# Patient Record
Sex: MALE | Race: WHITE | Employment: OTHER | URBAN - METROPOLITAN AREA
[De-identification: names, ages, dates, MRNs, and addresses within clinical notes are randomized per-mention and may not be internally consistent; named-entity substitution may affect disease eponyms.]

---

## 2017-01-09 ENCOUNTER — OFFICE VISIT (OUTPATIENT)
Dept: SURGERY | Age: 67
End: 2017-01-09

## 2017-01-09 VITALS
BODY MASS INDEX: 29.52 KG/M2 | DIASTOLIC BLOOD PRESSURE: 88 MMHG | HEART RATE: 62 BPM | WEIGHT: 230 LBS | HEIGHT: 74 IN | SYSTOLIC BLOOD PRESSURE: 138 MMHG

## 2017-01-09 DIAGNOSIS — K42.9 UMBILICAL HERNIA WITHOUT OBSTRUCTION AND WITHOUT GANGRENE: Primary | ICD-10-CM

## 2017-01-09 PROCEDURE — G8420 CALC BMI NORM PARAMETERS: HCPCS | Performed by: SURGERY

## 2017-01-09 PROCEDURE — G8484 FLU IMMUNIZE NO ADMIN: HCPCS | Performed by: SURGERY

## 2017-01-09 PROCEDURE — 1036F TOBACCO NON-USER: CPT | Performed by: SURGERY

## 2017-01-09 PROCEDURE — 4040F PNEUMOC VAC/ADMIN/RCVD: CPT | Performed by: SURGERY

## 2017-01-09 PROCEDURE — G8427 DOCREV CUR MEDS BY ELIG CLIN: HCPCS | Performed by: SURGERY

## 2017-01-09 PROCEDURE — 99203 OFFICE O/P NEW LOW 30 MIN: CPT | Performed by: SURGERY

## 2017-01-09 PROCEDURE — 3017F COLORECTAL CA SCREEN DOC REV: CPT | Performed by: SURGERY

## 2017-01-09 PROCEDURE — 1123F ACP DISCUSS/DSCN MKR DOCD: CPT | Performed by: SURGERY

## 2017-01-09 RX ORDER — TERAZOSIN 10 MG/1
10 CAPSULE ORAL NIGHTLY
COMMUNITY

## 2017-02-02 ENCOUNTER — TELEPHONE (OUTPATIENT)
Dept: SURGERY | Age: 67
End: 2017-02-02

## 2017-02-02 ASSESSMENT — ENCOUNTER SYMPTOMS: ABDOMINAL PAIN: 1

## 2017-02-17 ENCOUNTER — TELEPHONE (OUTPATIENT)
Dept: SURGERY | Age: 67
End: 2017-02-17

## 2017-02-22 ENCOUNTER — PAT TELEPHONE (OUTPATIENT)
Dept: PREADMISSION TESTING | Age: 67
End: 2017-02-22

## 2017-02-22 VITALS — BODY MASS INDEX: 28.88 KG/M2 | HEIGHT: 74 IN | WEIGHT: 225 LBS

## 2017-02-22 RX ORDER — FLUOROURACIL 50 MG/G
CREAM TOPICAL 2 TIMES DAILY
COMMUNITY
End: 2018-04-30

## 2017-02-22 RX ORDER — CETIRIZINE HYDROCHLORIDE 10 MG/1
10 TABLET ORAL PRN
COMMUNITY

## 2017-02-24 ENCOUNTER — HOSPITAL ENCOUNTER (OUTPATIENT)
Dept: SURGERY | Age: 67
Discharge: HOME | End: 2017-02-24
Attending: SURGERY | Admitting: SURGERY

## 2017-02-24 VITALS
DIASTOLIC BLOOD PRESSURE: 77 MMHG | HEART RATE: 55 BPM | RESPIRATION RATE: 18 BRPM | TEMPERATURE: 97.5 F | OXYGEN SATURATION: 98 % | SYSTOLIC BLOOD PRESSURE: 137 MMHG

## 2017-02-24 PROCEDURE — 49585 REPAIR UMBILICAL HERN,5+Y/O,REDUC: CPT | Performed by: SURGERY

## 2017-02-24 RX ORDER — SODIUM CHLORIDE 0.9 % (FLUSH) 0.9 %
10 SYRINGE (ML) INJECTION EVERY 12 HOURS SCHEDULED
Status: DISCONTINUED | OUTPATIENT
Start: 2017-02-24 | End: 2017-02-25 | Stop reason: HOSPADM

## 2017-02-24 RX ORDER — FENTANYL CITRATE 50 UG/ML
25 INJECTION, SOLUTION INTRAMUSCULAR; INTRAVENOUS EVERY 5 MIN PRN
Status: DISCONTINUED | OUTPATIENT
Start: 2017-02-24 | End: 2017-02-25 | Stop reason: HOSPADM

## 2017-02-24 RX ORDER — LABETALOL HYDROCHLORIDE 5 MG/ML
5 INJECTION, SOLUTION INTRAVENOUS EVERY 10 MIN PRN
Status: DISCONTINUED | OUTPATIENT
Start: 2017-02-24 | End: 2017-02-25 | Stop reason: HOSPADM

## 2017-02-24 RX ORDER — OXYCODONE HYDROCHLORIDE 5 MG/1
5 TABLET ORAL EVERY 4 HOURS PRN
Qty: 30 TABLET | Refills: 0 | Status: SHIPPED | OUTPATIENT
Start: 2017-02-24 | End: 2017-03-09 | Stop reason: CLARIF

## 2017-02-24 RX ORDER — PROMETHAZINE HYDROCHLORIDE 25 MG/ML
6.25 INJECTION, SOLUTION INTRAMUSCULAR; INTRAVENOUS
Status: ACTIVE | OUTPATIENT
Start: 2017-02-24 | End: 2017-02-24

## 2017-02-24 RX ORDER — SODIUM CHLORIDE 0.9 % (FLUSH) 0.9 %
10 SYRINGE (ML) INJECTION PRN
Status: DISCONTINUED | OUTPATIENT
Start: 2017-02-24 | End: 2017-02-25 | Stop reason: HOSPADM

## 2017-02-24 RX ORDER — SODIUM CHLORIDE 9 MG/ML
INJECTION, SOLUTION INTRAVENOUS CONTINUOUS
Status: DISCONTINUED | OUTPATIENT
Start: 2017-02-24 | End: 2017-02-25 | Stop reason: HOSPADM

## 2017-02-24 RX ADMIN — SODIUM CHLORIDE: 9 INJECTION, SOLUTION INTRAVENOUS at 08:09

## 2017-02-24 ASSESSMENT — PAIN DESCRIPTION - PAIN TYPE
TYPE: SURGICAL PAIN
TYPE: SURGICAL PAIN

## 2017-02-24 ASSESSMENT — PAIN SCALES - GENERAL
PAINLEVEL_OUTOF10: 2
PAINLEVEL_OUTOF10: 0
PAINLEVEL_OUTOF10: 2

## 2017-02-24 ASSESSMENT — PAIN - FUNCTIONAL ASSESSMENT: PAIN_FUNCTIONAL_ASSESSMENT: 0-10

## 2017-03-09 ENCOUNTER — OFFICE VISIT (OUTPATIENT)
Dept: SURGERY | Age: 67
End: 2017-03-09

## 2017-03-09 DIAGNOSIS — K42.9 UMBILICAL HERNIA WITHOUT OBSTRUCTION AND WITHOUT GANGRENE: Primary | ICD-10-CM

## 2017-03-09 PROCEDURE — 99024 POSTOP FOLLOW-UP VISIT: CPT | Performed by: SURGERY

## 2018-04-09 ENCOUNTER — TELEPHONE (OUTPATIENT)
Dept: CARDIOLOGY CLINIC | Age: 68
End: 2018-04-09

## 2018-04-09 NOTE — TELEPHONE ENCOUNTER
Dr. Paris Persaud asked that this patient be scheduled with EP soon for ER follow up for PSVT (new to us). I see that [facility] has an opening 4/26/18 at 8:30 but if Fátima Durbin has something sooner, that would be great. Can you put him on schedule and call him to confirm? Thanks so much!

## 2018-04-10 NOTE — TELEPHONE ENCOUNTER
Pt spoke to his PCP Dr Zhanna Durand and pt is going with  at [address] [place] [address].  Lupe Warner

## 2018-04-16 ENCOUNTER — TELEPHONE (OUTPATIENT)
Dept: CARDIOLOGY CLINIC | Age: 68
End: 2018-04-16

## 2018-04-30 ENCOUNTER — OFFICE VISIT (OUTPATIENT)
Dept: ENT CLINIC | Age: 68
End: 2018-04-30

## 2018-04-30 VITALS
SYSTOLIC BLOOD PRESSURE: 108 MMHG | HEIGHT: 74 IN | HEART RATE: 49 BPM | WEIGHT: 227 LBS | DIASTOLIC BLOOD PRESSURE: 68 MMHG | BODY MASS INDEX: 29.13 KG/M2

## 2018-04-30 DIAGNOSIS — H93.13 SUBJECTIVE TINNITUS OF BOTH EARS: Primary | Chronic | ICD-10-CM

## 2018-04-30 DIAGNOSIS — H91.93 BILATERAL HEARING LOSS, UNSPECIFIED HEARING LOSS TYPE: Chronic | ICD-10-CM

## 2018-04-30 PROCEDURE — 3017F COLORECTAL CA SCREEN DOC REV: CPT | Performed by: OTOLARYNGOLOGY

## 2018-04-30 PROCEDURE — G8427 DOCREV CUR MEDS BY ELIG CLIN: HCPCS | Performed by: OTOLARYNGOLOGY

## 2018-04-30 PROCEDURE — 1036F TOBACCO NON-USER: CPT | Performed by: OTOLARYNGOLOGY

## 2018-04-30 PROCEDURE — G8419 CALC BMI OUT NRM PARAM NOF/U: HCPCS | Performed by: OTOLARYNGOLOGY

## 2018-04-30 PROCEDURE — 4040F PNEUMOC VAC/ADMIN/RCVD: CPT | Performed by: OTOLARYNGOLOGY

## 2018-04-30 PROCEDURE — 99203 OFFICE O/P NEW LOW 30 MIN: CPT | Performed by: OTOLARYNGOLOGY

## 2018-04-30 PROCEDURE — 1123F ACP DISCUSS/DSCN MKR DOCD: CPT | Performed by: OTOLARYNGOLOGY

## 2018-05-03 ENCOUNTER — OFFICE VISIT (OUTPATIENT)
Dept: AUDIOLOGY | Age: 68
End: 2018-05-03

## 2018-05-03 DIAGNOSIS — H93.13 TINNITUS OF BOTH EARS: Primary | ICD-10-CM

## 2018-05-03 PROCEDURE — 92550 TYMPANOMETRY & REFLEX THRESH: CPT | Performed by: AUDIOLOGIST

## 2018-05-03 PROCEDURE — 92557 COMPREHENSIVE HEARING TEST: CPT | Performed by: AUDIOLOGIST

## 2018-06-04 ENCOUNTER — OFFICE VISIT (OUTPATIENT)
Dept: ENT CLINIC | Age: 68
End: 2018-06-04

## 2018-06-04 VITALS — DIASTOLIC BLOOD PRESSURE: 59 MMHG | HEART RATE: 48 BPM | SYSTOLIC BLOOD PRESSURE: 109 MMHG

## 2018-06-04 DIAGNOSIS — H90.3 BILATERAL HIGH FREQUENCY SENSORINEURAL HEARING LOSS: Primary | ICD-10-CM

## 2018-06-04 DIAGNOSIS — H93.13 SUBJECTIVE TINNITUS OF BOTH EARS: ICD-10-CM

## 2018-06-04 PROCEDURE — 1036F TOBACCO NON-USER: CPT | Performed by: OTOLARYNGOLOGY

## 2018-06-04 PROCEDURE — G8419 CALC BMI OUT NRM PARAM NOF/U: HCPCS | Performed by: OTOLARYNGOLOGY

## 2018-06-04 PROCEDURE — 4040F PNEUMOC VAC/ADMIN/RCVD: CPT | Performed by: OTOLARYNGOLOGY

## 2018-06-04 PROCEDURE — 3017F COLORECTAL CA SCREEN DOC REV: CPT | Performed by: OTOLARYNGOLOGY

## 2018-06-04 PROCEDURE — G8427 DOCREV CUR MEDS BY ELIG CLIN: HCPCS | Performed by: OTOLARYNGOLOGY

## 2018-06-04 PROCEDURE — 1123F ACP DISCUSS/DSCN MKR DOCD: CPT | Performed by: OTOLARYNGOLOGY

## 2018-06-04 PROCEDURE — 99213 OFFICE O/P EST LOW 20 MIN: CPT | Performed by: OTOLARYNGOLOGY

## 2018-06-04 RX ORDER — IBUPROFEN 200 MG
200 TABLET ORAL EVERY 6 HOURS PRN
COMMUNITY
End: 2019-11-21

## 2018-07-18 NOTE — PROGRESS NOTES
[facility]   Electrophysiology Consultation   Date: 7/19/2018  Reason for Consultation: SVT  Consult Requesting Physician: Nguyễn Miller MD      Chief Complaint   Patient presents with    New Patient     red by dr Lizbet Miranda for SVT        HPI: Jil Baer is a 80 y.o. male being seen to evaluate for SVT. Presented to ED 4/7/2018 for palpitations, CP, SOB, and feelings of dizziness after not sleeping well prior night. It is noted patient stated a history of -200 with SVT in past that he stated he thought was triggered by green tea and had no more episodes since. Denies pre-syncope or syncope episodes. He was drinking wine at the time the occurrence began. He did not follow up with cardiology for SVT in past.   He has recorded about 5 episodes since 4/2017 identified with Fit[employer]. He was able to use vagal maneuvers in past to cease or episodes did spontaneously convert. He has no past cardiac history other than bradycardia episodes in the 40-50's at rest. Not symptomatic with bradycardia. Patient denies lightheadedness, dizziness, palpitations, orthopnea, edema, presyncope or syncope. He is able to stop them with vagal maneuvers       Past Medical History:   Diagnosis Date    BPH (benign prostatic hyperplasia)     Bradycardia     Cancer (HCC)     skin ca removed    SVT (supraventricular tachycardia) ([facility] [address]         Past Surgical History:   Procedure Laterality Date    KNEE SURGERY Left 11/22/2016    TONSILLECTOMY         Allergies: Allergies   Allergen Reactions    Aleve [Naproxen Sodium] Hives    Ibuprofen     Naproxen Hives    Neosporin [Neomycin-Polymyxin-Gramicidin]        Social History:   reports that he quit smoking about 38 years ago. His smoking use included Cigarettes. He started smoking about 49 years ago. He has never used smokeless tobacco. He reports that he drinks alcohol. He reports that he does not use drugs. Family History:     Reviewed.  Denies family history of sudden cardiac death, arrhythmia, premature CAD    Review of System:  All other systems reviewed and are negative except for that noted above. Pertinent negatives are:   General: negative for fever, chills   Ophthalmic ROS: negative for - eye pain or loss of vision  ENT ROS: negative for - headaches, sore throat   Respiratory: negative for - cough, sputum  Cardiovascular: Reviewed in HPI  Gastrointestinal: negative for - abdominal pain, diarrhea, N/V  Hematology: negative for - bleeding, blood clots, bruising or jaundice  Genito-Urinary:  negative for - Dysuria or incontinence  Musculoskeletal: negative for - Joint swelling, muscle pain  Neurological: negative for - confusion, dizziness, headaches   Psychiatric: No anxiety, no depression. Dermatological: negative for - rash    Physical Examination:  Vitals:    07/19/18 0953   BP: 128/60   Pulse: 51   SpO2: 98%      Wt Readings from Last 3 Encounters:   07/19/18 225 lb (102.1 kg)   04/30/18 227 lb (103 kg)   04/07/18 220 lb (99.8 kg)       · Constitutional: Oriented. No distress. · Head: Normocephalic and atraumatic. · Mouth/Throat: Oropharynx is clear and moist.   · Eyes: Conjunctivae normal. EOM are normal.   · Neck: Neck supple. No rigidity. No JVD present. · Cardiovascular: Normal rate, regular rhythm, S1&S2. · Pulmonary/Chest: Bilateral respiratory sounds. No wheezes, No rhonchi. · Abdominal: Soft. Bowel sounds present. No distension, No tenderness. · Musculoskeletal: No tenderness. No edema    · Lymphadenopathy: Has no cervical adenopathy. · Neurological: Alert and oriented. Cranial nerve appears intact, No Gross deficit   · Skin: Skin is warm and dry. No rash noted. · Psychiatric: Has a normal behavior       Labs, diagnostic and imaging results reviewed. Reviewed.    Lab Results   Component Value Date    TSHREFLEX 3.77 04/07/2018    CREATININE 0.9 04/07/2018    CREATININE 0.9 07/27/2017    AST 24 04/07/2018    ALT 22 04/07/2018     ECG 7/19/2018: SB  53bpm  QTcH 414    Cardiac event monitor 7/5/2018:  CONCLUSION:  SINUS RHYTHM  SINUS BRADYCARDIA TO RATES AS LOW AS 38 BPM  TWO RUNS OF SVT TO RATES BETWEEN 180-200 BPM  SYMPTOMS WERE NOTED DURING ONE OF THE EPISODES THAT LASTED AROUND   1 MINUTE   THERE WERE SHORT RUNS OF SVT WITHOUT SYMPTOMS  PAC'S WERE NOTED AND SYMPTOMS WERE NOTED WITH THE PAC'S      Echo 8/8/2017:  - Left ventricle: The cavity size was mildly dilated. Wall thickness    was increased in a pattern of mild LVH. Systolic function was    normal. The estimated ejection fraction was in the range of 55% to    65%. Wall motion was normal; there were no regional wall motion    abnormalities. - Right ventricle: Systolic function was normal. TAPSE: 2.9cm.  - Right atrium: The atrium was mildly dilated. Echo 11/4/2010:  - Left ventricle: The cavity size was mildly to moderately dilated.     Wall thickness was normal. Systolic function was normal. The     estimated ejection fraction was in the range of 55% to 60%. Wall     motion was normal; there were no regional wall motion     abnormalities. Left ventricular diastolic function parameters     were otherwise normal.   - Mitral valve: Mild regurgitation.   - Left atrium: The atrium was mildly dilated. - Right ventricle: The cavity size was mildly dilated. - Pulmonic valve: Peak gradient: 4mm Hg (S). Medication:  Current Outpatient Prescriptions   Medication Sig Dispense Refill    ibuprofen (ADVIL;MOTRIN) 200 MG tablet Take 200 mg by mouth every 6 hours as needed for Pain      cetirizine (ZYRTEC) 10 MG tablet Take 10 mg by mouth as needed for Allergies      terazosin (HYTRIN) 10 MG capsule Take 10 mg by mouth nightly       No current facility-administered medications for this visit. Assessment and plan:     SVT  Noted on EKG from 4/2018 ED. 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  Would use care in medication given history with josep HR. He has had success in stopping episodes so does not feel medication is needed at this time. - discussed AVNRT ablation. The risks, benefits and alternatives of the ablation procedure were discussed with the patient. The risks including, but not limited to, the risks of bleeding, infection, radiation exposure, injury to vascular, cardiac and surrounding structures (including pneumothorax), stroke, cardiac perforation, tamponade, need for emergent open heart surgery, need for pacemaker implantation, Injury to the phrenic nerve, injury to the esophagus, myocardial infarction and death were discussed in detail. The patient opted to consider procedure after praying and speaking with family. Will schedule, he will contact office to cancel should he not want to proceed. Bradycardia  No associated symptoms. Feels well        Plan:  Schedule SVT ablation to have it ready, will contact office should he consider it to be not necessary at current   Follow up 6 months          Thank you for allowing me to participate in the care of Rosio Beatty. Further evaluation will be based upon the patient's clinical course and testing results. All questions and concerns were addressed to the patient/family. Alternatives to my treatment were discussed. I have discussed the above stated plan and the patient verbalized understanding and agreed with the plan. NOTE: This report was transcribed using voice recognition software. Every effort was made to ensure accuracy, however, inadvertent computerized transcription errors may be present. Scribe attestation: This note was scribed in the presence of Torrie Rubin MD by Erick Sierra RN    Physician attestation: The scribe's documentation has been prepared under my direction and has been personally reviewed by me in its entirety. I confirm that the note above reflects all work, treatment, procedures, and medical decision making performed by me.        Eldon Paul Escobedo MD, [facility], [address] [place] [address]   Office: [phone]

## 2018-07-19 ENCOUNTER — PATIENT MESSAGE (OUTPATIENT)
Dept: CARDIOLOGY CLINIC | Age: 68
End: 2018-07-19

## 2018-07-19 ENCOUNTER — OFFICE VISIT (OUTPATIENT)
Dept: CARDIOLOGY CLINIC | Age: 68
End: 2018-07-19

## 2018-07-19 VITALS
HEART RATE: 51 BPM | DIASTOLIC BLOOD PRESSURE: 60 MMHG | OXYGEN SATURATION: 98 % | HEIGHT: 74 IN | WEIGHT: 225 LBS | BODY MASS INDEX: 28.88 KG/M2 | SYSTOLIC BLOOD PRESSURE: 128 MMHG

## 2018-07-19 DIAGNOSIS — R00.1 BRADYCARDIA: ICD-10-CM

## 2018-07-19 DIAGNOSIS — I47.1 SVT (SUPRAVENTRICULAR TACHYCARDIA) (HCC): Primary | ICD-10-CM

## 2018-07-19 PROCEDURE — 1123F ACP DISCUSS/DSCN MKR DOCD: CPT | Performed by: INTERNAL MEDICINE

## 2018-07-19 PROCEDURE — 1101F PT FALLS ASSESS-DOCD LE1/YR: CPT | Performed by: INTERNAL MEDICINE

## 2018-07-19 PROCEDURE — G8419 CALC BMI OUT NRM PARAM NOF/U: HCPCS | Performed by: INTERNAL MEDICINE

## 2018-07-19 PROCEDURE — 99205 OFFICE O/P NEW HI 60 MIN: CPT | Performed by: INTERNAL MEDICINE

## 2018-07-19 PROCEDURE — 4040F PNEUMOC VAC/ADMIN/RCVD: CPT | Performed by: INTERNAL MEDICINE

## 2018-07-19 PROCEDURE — G8427 DOCREV CUR MEDS BY ELIG CLIN: HCPCS | Performed by: INTERNAL MEDICINE

## 2018-07-19 PROCEDURE — 3017F COLORECTAL CA SCREEN DOC REV: CPT | Performed by: INTERNAL MEDICINE

## 2018-07-19 PROCEDURE — 93000 ELECTROCARDIOGRAM COMPLETE: CPT | Performed by: INTERNAL MEDICINE

## 2018-07-19 PROCEDURE — 1036F TOBACCO NON-USER: CPT | Performed by: INTERNAL MEDICINE

## 2018-07-19 NOTE — PATIENT INSTRUCTIONS
Patient Education        Supraventricular Tachycardia: Care Instructions  Your Care Instructions    Having supraventricular tachycardia (SVT) means that from time to time your heart beats abnormally fast. This fast rhythm is caused by changes in the electrical system of your heart. You may feel a fluttering in your chest (palpitations) and have a fast pulse. When your heart is beating fast, you may feel anxious and lightheaded, be short of breath, and feel discomfort in the chest.  Your doctor may prescribe medicines to help slow down your heartbeat. Your doctor may also suggest you try vagal maneuvers when having an episode of SVT. These are things, like bearing down, that might help slow your heart rate. Bearing down means that you try to breathe out with your stomach muscles but you don't let air out of your nose or mouth. Your doctor can show you how to do vagal maneuvers. He or she may suggest you lie down on your back to do them. In some cases, either cardioversion treatment or a procedure called catheter ablation is done to correct SVT. Your doctor may ask you to wear a small electronic device for 1 or 2 days to monitor your heart. It is called a Holter monitor. Follow-up care is a key part of your treatment and safety. Be sure to make and go to all appointments, and call your doctor if you are having problems. It's also a good idea to know your test results and keep a list of the medicines you take. How can you care for yourself at home? · Be safe with medicines. Take your medicines exactly as prescribed. Call your doctor if you think you are having a problem with your medicine. You will get more details on the specific medicines your doctor prescribes. · If your doctor showed you how to do vagal maneuvers, try them when you have an episode. These maneuvers include bearing down or putting an ice-cold, wet towel on your face. · Monitor your condition by keeping a diary of your SVT episodes.  Bring this to learn more about \"Supraventricular Tachycardia: Care Instructions. \"     If you do not have an account, please click on the \"Sign Up Now\" link. Current as of: December 6, 2017  Content Version: 11.6  © 2537-9129 [employer]. Care instructions adapted under license by [facility] ([facility]). If you have questions about a medical condition or this instruction, always ask your healthcare professional. [address] any warranty or liability for your use of this information.

## 2018-10-16 ENCOUNTER — TELEPHONE (OUTPATIENT)
Dept: CARDIOLOGY CLINIC | Age: 68
End: 2018-10-16

## 2018-10-16 DIAGNOSIS — I48.3 TYPICAL ATRIAL FLUTTER (HCC): ICD-10-CM

## 2018-10-16 DIAGNOSIS — I47.1 AVNRT (AV NODAL RE-ENTRY TACHYCARDIA) (HCC): ICD-10-CM

## 2018-10-16 NOTE — TELEPHONE ENCOUNTER
He had an ablation 9/11/18 . He has had two \"episodes\" since his ablation , one on 9/28 and one on 10/2. The episodes didn't last long and he is ok now .  He has appt 11/14/18 with JAIME

## 2018-10-25 ENCOUNTER — NURSE ONLY (OUTPATIENT)
Dept: CARDIOLOGY CLINIC | Age: 68
End: 2018-10-25
Payer: MEDICARE

## 2018-10-25 ENCOUNTER — OFFICE VISIT (OUTPATIENT)
Dept: CARDIOLOGY CLINIC | Age: 68
End: 2018-10-25
Payer: MEDICARE

## 2018-10-25 VITALS
DIASTOLIC BLOOD PRESSURE: 75 MMHG | HEIGHT: 74 IN | BODY MASS INDEX: 28.76 KG/M2 | WEIGHT: 224.12 LBS | SYSTOLIC BLOOD PRESSURE: 133 MMHG | HEART RATE: 53 BPM

## 2018-10-25 DIAGNOSIS — I47.1 AVNRT (AV NODAL RE-ENTRY TACHYCARDIA) (HCC): ICD-10-CM

## 2018-10-25 DIAGNOSIS — I48.3 TYPICAL ATRIAL FLUTTER (HCC): ICD-10-CM

## 2018-10-25 DIAGNOSIS — R00.2 PALPITATION: Primary | ICD-10-CM

## 2018-10-25 DIAGNOSIS — R00.1 BRADYCARDIA: ICD-10-CM

## 2018-10-25 PROCEDURE — 1101F PT FALLS ASSESS-DOCD LE1/YR: CPT | Performed by: INTERNAL MEDICINE

## 2018-10-25 PROCEDURE — 99213 OFFICE O/P EST LOW 20 MIN: CPT | Performed by: INTERNAL MEDICINE

## 2018-10-25 PROCEDURE — 4040F PNEUMOC VAC/ADMIN/RCVD: CPT | Performed by: INTERNAL MEDICINE

## 2018-10-25 PROCEDURE — 93000 ELECTROCARDIOGRAM COMPLETE: CPT | Performed by: INTERNAL MEDICINE

## 2018-10-25 PROCEDURE — G8419 CALC BMI OUT NRM PARAM NOF/U: HCPCS | Performed by: INTERNAL MEDICINE

## 2018-10-25 PROCEDURE — 1036F TOBACCO NON-USER: CPT | Performed by: INTERNAL MEDICINE

## 2018-10-25 PROCEDURE — 3017F COLORECTAL CA SCREEN DOC REV: CPT | Performed by: INTERNAL MEDICINE

## 2018-10-25 PROCEDURE — G8484 FLU IMMUNIZE NO ADMIN: HCPCS | Performed by: INTERNAL MEDICINE

## 2018-10-25 PROCEDURE — G8427 DOCREV CUR MEDS BY ELIG CLIN: HCPCS | Performed by: INTERNAL MEDICINE

## 2018-10-25 PROCEDURE — 1123F ACP DISCUSS/DSCN MKR DOCD: CPT | Performed by: INTERNAL MEDICINE

## 2018-10-25 NOTE — PROGRESS NOTES
Allergies: Allergies   Allergen Reactions    Aleve [Naproxen Sodium] Hives    Ibuprofen     Naproxen Hives    Neosporin [Neomycin-Polymyxin-Gramicidin]        Social History:   reports that he quit smoking about 38 years ago. His smoking use included Cigarettes. He started smoking about 49 years ago. He has never used smokeless tobacco. He reports that he drinks alcohol. He reports that he does not use drugs. Family History:     Reviewed. Denies family history of sudden cardiac death, arrhythmia, premature CAD    Review of System:  All other systems reviewed and are negative except for that noted above. Pertinent negatives are:   General: negative for fever, chills   Ophthalmic ROS: negative for - eye pain or loss of vision  ENT ROS: negative for - headaches, sore throat   Respiratory: negative for - cough, sputum  Cardiovascular: Reviewed in HPI  Gastrointestinal: negative for - abdominal pain, diarrhea, N/V  Hematology: negative for - bleeding, blood clots, bruising or jaundice  Genito-Urinary:  negative for - Dysuria or incontinence  Musculoskeletal: negative for - Joint swelling, muscle pain  Neurological: negative for - confusion, dizziness, headaches   Psychiatric: No anxiety, no depression. Dermatological: negative for - rash    Physical Examination:  Vitals:    10/25/18 1128   BP: 133/75   Pulse: 53      Wt Readings from Last 3 Encounters:   10/25/18 224 lb 1.9 oz (101.7 kg)   09/11/18 220 lb (99.8 kg)   07/19/18 225 lb (102.1 kg)       · Constitutional: Oriented. No distress. · Head: Normocephalic and atraumatic. · Mouth/Throat: Oropharynx is clear and moist.   · Eyes: Conjunctivae normal. EOM are normal.   · Neck: Neck supple. No rigidity. No JVD present. · Cardiovascular: Normal rate, regular rhythm, S1&S2. · Pulmonary/Chest: Bilateral respiratory sounds. No wheezes, No rhonchi. · Abdominal: Soft. Bowel sounds present. No distension, No tenderness.    · Musculoskeletal: No tenderness. No edema    · Lymphadenopathy: Has no cervical adenopathy. · Neurological: Alert and oriented. Cranial nerve appears intact, No Gross deficit   · Skin: Skin is warm and dry. No rash noted. · Psychiatric: Has a normal behavior       Labs, diagnostic and imaging results reviewed. Reviewed. Lab Results   Component Value Date    TSHREFLEX 3.77 04/07/2018    CREATININE 0.9 09/11/2018    CREATININE 0.9 04/07/2018    CREATININE 0.9 07/27/2017    AST 24 04/07/2018    ALT 22 04/07/2018     ECG 10/25/2018: SB 50bpm  QTcH 407    Cardiac event monitor 7/5/2018:  CONCLUSION:  SINUS RHYTHM  SINUS BRADYCARDIA TO RATES AS LOW AS 38 BPM  TWO RUNS OF SVT TO RATES BETWEEN 180-200 BPM  SYMPTOMS WERE NOTED DURING ONE OF THE EPISODES THAT LASTED AROUND   1 MINUTE   THERE WERE SHORT RUNS OF SVT WITHOUT SYMPTOMS  PAC'S WERE NOTED AND SYMPTOMS WERE NOTED WITH THE PAC'S      Echo 8/8/2017:  - Left ventricle: The cavity size was mildly dilated. Wall thickness    was increased in a pattern of mild LVH. Systolic function was    normal. The estimated ejection fraction was in the range of 55% to    65%. Wall motion was normal; there were no regional wall motion    abnormalities. - Right ventricle: Systolic function was normal. TAPSE: 2.9cm.  - Right atrium: The atrium was mildly dilated. Echo 11/4/2010:  - Left ventricle: The cavity size was mildly to moderately dilated.     Wall thickness was normal. Systolic function was normal. The     estimated ejection fraction was in the range of 55% to 60%. Wall     motion was normal; there were no regional wall motion     abnormalities. Left ventricular diastolic function parameters     were otherwise normal.   - Mitral valve: Mild regurgitation.   - Left atrium: The atrium was mildly dilated. - Right ventricle: The cavity size was mildly dilated. - Pulmonic valve: Peak gradient: 4mm Hg (S).     Medication:  Current Outpatient Prescriptions   Medication Sig Dispense Refill    ibuprofen (ADVIL;MOTRIN) 200 MG tablet Take 200 mg by mouth every 6 hours as needed for Pain      cetirizine (ZYRTEC) 10 MG tablet Take 10 mg by mouth as needed for Allergies      terazosin (HYTRIN) 10 MG capsule Take 10 mg by mouth nightly       No current facility-administered medications for this visit. Assessment and plan:     SVT  9/11/2018 ablation for SVT  Noted on EKG from 4/2018 ED. 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 S/p ablation AVNRT 9/11/2018;       palpitations   2 episodes of tachycardia since that time  Will do event monitor        Bradycardia  No associated symptoms. Feels well        Plan:  MCOT to assess given his statement of 2 episodes since ablation  Follow up 8 weeks for review      Thank you for allowing me to participate in the care of Rasheed Mathews. Further evaluation will be based upon the patient's clinical course and testing results. All questions and concerns were addressed to the patient/family. Alternatives to my treatment were discussed. I have discussed the above stated plan and the patient verbalized understanding and agreed with the plan. NOTE: This report was transcribed using voice recognition software. Every effort was made to ensure accuracy, however, inadvertent computerized transcription errors may be present. Scribe attestation: This note was scribed in the presence of Christiano Reddy MD by Shayan Feliciano RN    Physician attestation: The scribe's documentation has been prepared under my direction and has been personally reviewed by me in its entirety. I confirm that the note above reflects all work, treatment, procedures, and medical decision making performed by me.        Christiano Reddy MD, [address]   Office: [phone]

## 2018-11-26 ENCOUNTER — TELEPHONE (OUTPATIENT)
Dept: CARDIOLOGY CLINIC | Age: 68
End: 2018-11-26

## 2018-11-27 PROCEDURE — 93228 REMOTE 30 DAY ECG REV/REPORT: CPT | Performed by: INTERNAL MEDICINE

## 2019-01-09 ENCOUNTER — OFFICE VISIT (OUTPATIENT)
Dept: CARDIOLOGY CLINIC | Age: 69
End: 2019-01-09
Payer: MEDICARE

## 2019-01-09 VITALS
HEIGHT: 74 IN | HEART RATE: 70 BPM | SYSTOLIC BLOOD PRESSURE: 138 MMHG | BODY MASS INDEX: 29 KG/M2 | WEIGHT: 226 LBS | DIASTOLIC BLOOD PRESSURE: 88 MMHG

## 2019-01-09 DIAGNOSIS — I47.1 AVNRT (AV NODAL RE-ENTRY TACHYCARDIA) (HCC): ICD-10-CM

## 2019-01-09 DIAGNOSIS — R00.2 PALPITATIONS: ICD-10-CM

## 2019-01-09 DIAGNOSIS — R94.31 ABNORMAL ELECTROCARDIOGRAM: ICD-10-CM

## 2019-01-09 DIAGNOSIS — I47.1 SVT (SUPRAVENTRICULAR TACHYCARDIA) (HCC): Primary | ICD-10-CM

## 2019-01-09 DIAGNOSIS — I47.29 NSVT (NONSUSTAINED VENTRICULAR TACHYCARDIA): ICD-10-CM

## 2019-01-09 DIAGNOSIS — I48.3 TYPICAL ATRIAL FLUTTER (HCC): ICD-10-CM

## 2019-01-09 DIAGNOSIS — R00.1 BRADYCARDIA: ICD-10-CM

## 2019-01-09 PROCEDURE — 3017F COLORECTAL CA SCREEN DOC REV: CPT | Performed by: INTERNAL MEDICINE

## 2019-01-09 PROCEDURE — 99999 PR OFFICE/OUTPT VISIT,PROCEDURE ONLY: CPT | Performed by: INTERNAL MEDICINE

## 2019-01-09 PROCEDURE — 1036F TOBACCO NON-USER: CPT | Performed by: INTERNAL MEDICINE

## 2019-01-09 PROCEDURE — G8419 CALC BMI OUT NRM PARAM NOF/U: HCPCS | Performed by: INTERNAL MEDICINE

## 2019-01-09 PROCEDURE — 93000 ELECTROCARDIOGRAM COMPLETE: CPT | Performed by: INTERNAL MEDICINE

## 2019-01-09 PROCEDURE — G8484 FLU IMMUNIZE NO ADMIN: HCPCS | Performed by: INTERNAL MEDICINE

## 2019-01-09 PROCEDURE — 4040F PNEUMOC VAC/ADMIN/RCVD: CPT | Performed by: INTERNAL MEDICINE

## 2019-01-09 PROCEDURE — 1123F ACP DISCUSS/DSCN MKR DOCD: CPT | Performed by: INTERNAL MEDICINE

## 2019-01-09 PROCEDURE — 1101F PT FALLS ASSESS-DOCD LE1/YR: CPT | Performed by: INTERNAL MEDICINE

## 2019-01-09 PROCEDURE — G8427 DOCREV CUR MEDS BY ELIG CLIN: HCPCS | Performed by: INTERNAL MEDICINE

## 2019-01-24 ENCOUNTER — OFFICE VISIT (OUTPATIENT)
Dept: SURGERY | Age: 69
End: 2019-01-24
Payer: MEDICARE

## 2019-01-24 VITALS
DIASTOLIC BLOOD PRESSURE: 72 MMHG | SYSTOLIC BLOOD PRESSURE: 138 MMHG | HEIGHT: 74 IN | HEART RATE: 47 BPM | BODY MASS INDEX: 29.9 KG/M2 | WEIGHT: 233 LBS

## 2019-01-24 DIAGNOSIS — R10.31 RIGHT GROIN PAIN: Primary | ICD-10-CM

## 2019-01-24 PROCEDURE — 99213 OFFICE O/P EST LOW 20 MIN: CPT | Performed by: SURGERY

## 2019-01-24 PROCEDURE — 1101F PT FALLS ASSESS-DOCD LE1/YR: CPT | Performed by: SURGERY

## 2019-01-24 PROCEDURE — G8484 FLU IMMUNIZE NO ADMIN: HCPCS | Performed by: SURGERY

## 2019-01-24 PROCEDURE — 1123F ACP DISCUSS/DSCN MKR DOCD: CPT | Performed by: SURGERY

## 2019-01-24 PROCEDURE — G8427 DOCREV CUR MEDS BY ELIG CLIN: HCPCS | Performed by: SURGERY

## 2019-01-24 PROCEDURE — 4040F PNEUMOC VAC/ADMIN/RCVD: CPT | Performed by: SURGERY

## 2019-01-24 PROCEDURE — 1036F TOBACCO NON-USER: CPT | Performed by: SURGERY

## 2019-01-24 PROCEDURE — G8419 CALC BMI OUT NRM PARAM NOF/U: HCPCS | Performed by: SURGERY

## 2019-01-24 PROCEDURE — 3017F COLORECTAL CA SCREEN DOC REV: CPT | Performed by: SURGERY

## 2019-01-24 ASSESSMENT — ENCOUNTER SYMPTOMS: ABDOMINAL PAIN: 1

## 2019-02-06 ENCOUNTER — HOSPITAL ENCOUNTER (OUTPATIENT)
Dept: NON INVASIVE DIAGNOSTICS | Age: 69
Discharge: HOME | End: 2019-02-06
Payer: MEDICARE

## 2019-02-06 DIAGNOSIS — I47.1 SVT (SUPRAVENTRICULAR TACHYCARDIA) (HCC): ICD-10-CM

## 2019-02-06 DIAGNOSIS — R94.31 ABNORMAL ELECTROCARDIOGRAM: ICD-10-CM

## 2019-02-06 DIAGNOSIS — R00.2 PALPITATIONS: ICD-10-CM

## 2019-02-06 LAB
LV EF: 55 %
LVEF MODALITY: NORMAL

## 2019-02-06 PROCEDURE — 78452 HT MUSCLE IMAGE SPECT MULT: CPT

## 2019-02-06 PROCEDURE — 93017 CV STRESS TEST TRACING ONLY: CPT | Performed by: INTERNAL MEDICINE

## 2019-02-06 PROCEDURE — A9502 TC99M TETROFOSMIN: HCPCS | Performed by: INTERNAL MEDICINE

## 2019-02-06 PROCEDURE — 3430000000 HC RX DIAGNOSTIC RADIOPHARMACEUTICAL: Performed by: INTERNAL MEDICINE

## 2019-02-06 RX ADMIN — TETROFOSMIN 30 MILLICURIE: 0.23 INJECTION, POWDER, LYOPHILIZED, FOR SOLUTION INTRAVENOUS at 09:28

## 2019-02-06 RX ADMIN — TETROFOSMIN 10 MILLICURIE: 0.23 INJECTION, POWDER, LYOPHILIZED, FOR SOLUTION INTRAVENOUS at 08:08

## 2019-06-04 ENCOUNTER — OFFICE VISIT (OUTPATIENT)
Dept: ENT CLINIC | Age: 69
End: 2019-06-04
Payer: MEDICARE

## 2019-06-04 VITALS
WEIGHT: 235 LBS | DIASTOLIC BLOOD PRESSURE: 81 MMHG | HEIGHT: 74 IN | HEART RATE: 50 BPM | SYSTOLIC BLOOD PRESSURE: 133 MMHG | OXYGEN SATURATION: 97 % | RESPIRATION RATE: 14 BRPM | BODY MASS INDEX: 30.16 KG/M2

## 2019-06-04 DIAGNOSIS — H93.13 SUBJECTIVE TINNITUS OF BOTH EARS: ICD-10-CM

## 2019-06-04 DIAGNOSIS — H90.3 BILATERAL HIGH FREQUENCY SENSORINEURAL HEARING LOSS: Primary | ICD-10-CM

## 2019-06-04 PROCEDURE — 4040F PNEUMOC VAC/ADMIN/RCVD: CPT | Performed by: OTOLARYNGOLOGY

## 2019-06-04 PROCEDURE — 1123F ACP DISCUSS/DSCN MKR DOCD: CPT | Performed by: OTOLARYNGOLOGY

## 2019-06-04 PROCEDURE — 3017F COLORECTAL CA SCREEN DOC REV: CPT | Performed by: OTOLARYNGOLOGY

## 2019-06-04 PROCEDURE — G8417 CALC BMI ABV UP PARAM F/U: HCPCS | Performed by: OTOLARYNGOLOGY

## 2019-06-04 PROCEDURE — 1036F TOBACCO NON-USER: CPT | Performed by: OTOLARYNGOLOGY

## 2019-06-04 PROCEDURE — G8427 DOCREV CUR MEDS BY ELIG CLIN: HCPCS | Performed by: OTOLARYNGOLOGY

## 2019-06-04 PROCEDURE — 99213 OFFICE O/P EST LOW 20 MIN: CPT | Performed by: OTOLARYNGOLOGY

## 2019-06-04 NOTE — PATIENT INSTRUCTIONS
· You should return for an annual hearing test to monitor your hearing, and whenever your hearing further decreases significantly. · You should employ the tinnitus masking techniques and strategies we discussed, as needed. Bedside tinnitus masking devices (eg. a white noise machine, noise machine, noise dhruv on your cell phone, fan on in the room, radio with light music or tuned between stations to white noise static) can be very helpful. · You should avoid exposure to excessively high levels of noise/sound and use hearing protection measures we discussed, as needed, if such exposure is unavoidable. · NO Q-TIPS IN THE EARS  You should never clean your ears with a Q-tip, cotton tipped applicator, Radha pin, paper clip, or any other instrument. I recommend only use of one the several ear wax removal kits available \"over the counter\" (eg. [employer] or [employer], or [employer]) if you feel a need to try to remove ear wax. No other methods should be self used for this purpose as there is danger of injury to the ear and risk of irreparable and irreversible permanent hearing loss.

## 2019-07-09 NOTE — PROGRESS NOTES
present. · Cardiovascular: Normal rate, regular rhythm, S1&S2. · Pulmonary/Chest: Bilateral respiratory sounds. No wheezes, No rhonchi. · Abdominal: Soft. Bowel sounds present. No distension, No tenderness. · Musculoskeletal: No tenderness. No edema    · Lymphadenopathy: Has no cervical adenopathy. · Neurological: Alert and oriented. Cranial nerve appears intact, No Gross deficit   · Skin: Skin is warm and dry. No rash noted. · Psychiatric: Has a normal behavior       Labs, diagnostic and imaging results reviewed. Reviewed. Lab Results   Component Value Date    TSHREFLEX 3.77 04/07/2018    CREATININE 0.9 09/11/2018    CREATININE 0.9 04/07/2018    CREATININE 0.9 07/27/2017    AST 24 04/07/2018    ALT 22 04/07/2018     ECG 7/10/2019:   Sinus Bradycardia  QTc 415    Stress test 2/6/19  Summary    Normal LV size and systolic function.    Left ventricular ejection fraction of 55 %.    There is normal isotope uptake at stress and rest.    There is no evidence of myocardial ischemia or scar.           MCOT 10/26/18-11/24/18  Sinus rhythm with PAT and NSVT  HR  BPM with avg of 55 bpm   PVC And PAC 1%    Cardiac event monitor 7/5/2018:  CONCLUSION:  SINUS RHYTHM  SINUS BRADYCARDIA TO RATES AS LOW AS 38 BPM  TWO RUNS OF SVT TO RATES BETWEEN 180-200 BPM  SYMPTOMS WERE NOTED DURING ONE OF THE EPISODES THAT LASTED AROUND   1 MINUTE   THERE WERE SHORT RUNS OF SVT WITHOUT SYMPTOMS  PAC'S WERE NOTED AND SYMPTOMS WERE NOTED WITH THE PAC'S      Echo 8/8/2017:  - Left ventricle: The cavity size was mildly dilated. Wall thickness    was increased in a pattern of mild LVH. Systolic function was    normal. The estimated ejection fraction was in the range of 55% to    65%. Wall motion was normal; there were no regional wall motion    abnormalities. - Right ventricle: Systolic function was normal. TAPSE: 2.9cm.  - Right atrium: The atrium was mildly dilated. Echo 11/4/2010:  - Left ventricle:  The cavity size

## 2019-07-10 ENCOUNTER — OFFICE VISIT (OUTPATIENT)
Dept: CARDIOLOGY CLINIC | Age: 69
End: 2019-07-10
Payer: MEDICARE

## 2019-07-10 VITALS
HEART RATE: 52 BPM | BODY MASS INDEX: 29.75 KG/M2 | HEIGHT: 74 IN | SYSTOLIC BLOOD PRESSURE: 133 MMHG | DIASTOLIC BLOOD PRESSURE: 82 MMHG | WEIGHT: 231.8 LBS

## 2019-07-10 DIAGNOSIS — I47.1 AVNRT (AV NODAL RE-ENTRY TACHYCARDIA) (HCC): Primary | ICD-10-CM

## 2019-07-10 DIAGNOSIS — R00.1 BRADYCARDIA: ICD-10-CM

## 2019-07-10 DIAGNOSIS — R00.2 PALPITATION: ICD-10-CM

## 2019-07-10 DIAGNOSIS — I48.3 TYPICAL ATRIAL FLUTTER (HCC): ICD-10-CM

## 2019-07-10 DIAGNOSIS — I47.29 NSVT (NONSUSTAINED VENTRICULAR TACHYCARDIA): ICD-10-CM

## 2019-07-10 PROCEDURE — 93000 ELECTROCARDIOGRAM COMPLETE: CPT | Performed by: INTERNAL MEDICINE

## 2019-07-10 PROCEDURE — G8417 CALC BMI ABV UP PARAM F/U: HCPCS | Performed by: INTERNAL MEDICINE

## 2019-07-10 PROCEDURE — 1123F ACP DISCUSS/DSCN MKR DOCD: CPT | Performed by: INTERNAL MEDICINE

## 2019-07-10 PROCEDURE — 1036F TOBACCO NON-USER: CPT | Performed by: INTERNAL MEDICINE

## 2019-07-10 PROCEDURE — 4040F PNEUMOC VAC/ADMIN/RCVD: CPT | Performed by: INTERNAL MEDICINE

## 2019-07-10 PROCEDURE — 3017F COLORECTAL CA SCREEN DOC REV: CPT | Performed by: INTERNAL MEDICINE

## 2019-07-10 PROCEDURE — 99214 OFFICE O/P EST MOD 30 MIN: CPT | Performed by: INTERNAL MEDICINE

## 2019-07-10 PROCEDURE — G8427 DOCREV CUR MEDS BY ELIG CLIN: HCPCS | Performed by: INTERNAL MEDICINE

## 2019-10-02 ENCOUNTER — HOSPITAL ENCOUNTER (OUTPATIENT)
Age: 69
Discharge: HOME | End: 2019-10-02
Payer: MEDICARE

## 2019-10-02 ENCOUNTER — TELEPHONE (OUTPATIENT)
Dept: CARDIOLOGY CLINIC | Age: 69
End: 2019-10-02

## 2019-10-02 ENCOUNTER — NURSE ONLY (OUTPATIENT)
Dept: CARDIOLOGY CLINIC | Age: 69
End: 2019-10-02
Payer: MEDICARE

## 2019-10-02 DIAGNOSIS — R00.0 TACHYCARDIA: ICD-10-CM

## 2019-10-02 DIAGNOSIS — R00.2 PALPITATIONS: Primary | ICD-10-CM

## 2019-10-02 DIAGNOSIS — R00.2 PALPITATIONS: ICD-10-CM

## 2019-10-02 LAB
ANION GAP SERPL CALCULATED.3IONS-SCNC: 17 MMOL/L (ref 3–16)
BUN BLDV-MCNC: 13 MG/DL (ref 7–20)
CALCIUM SERPL-MCNC: 9.7 MG/DL (ref 8.3–10.6)
CHLORIDE BLD-SCNC: 102 MMOL/L (ref 99–110)
CO2: 21 MMOL/L (ref 21–32)
CREAT SERPL-MCNC: 0.9 MG/DL (ref 0.8–1.3)
GFR AFRICAN AMERICAN: >60
GFR NON-AFRICAN AMERICAN: >60
GLUCOSE BLD-MCNC: 105 MG/DL (ref 70–99)
HCT VFR BLD CALC: 48.2 % (ref 40.5–52.5)
HEMOGLOBIN: 16.8 G/DL (ref 13.5–17.5)
MAGNESIUM: 2.3 MG/DL (ref 1.8–2.4)
MCH RBC QN AUTO: 31.4 PG (ref 26–34)
MCHC RBC AUTO-ENTMCNC: 34.8 G/DL (ref 31–36)
MCV RBC AUTO: 90.2 FL (ref 80–100)
PDW BLD-RTO: 13.6 % (ref 12.4–15.4)
PLATELET # BLD: 144 K/UL (ref 135–450)
PMV BLD AUTO: 10.3 FL (ref 5–10.5)
POTASSIUM SERPL-SCNC: 4.5 MMOL/L (ref 3.5–5.1)
RBC # BLD: 5.34 M/UL (ref 4.2–5.9)
SODIUM BLD-SCNC: 140 MMOL/L (ref 136–145)
TSH REFLEX: 2.27 UIU/ML (ref 0.27–4.2)
WBC # BLD: 9.4 K/UL (ref 4–11)

## 2019-10-02 PROCEDURE — 80048 BASIC METABOLIC PNL TOTAL CA: CPT

## 2019-10-02 PROCEDURE — 36415 COLL VENOUS BLD VENIPUNCTURE: CPT

## 2019-10-02 PROCEDURE — 85027 COMPLETE CBC AUTOMATED: CPT

## 2019-10-02 PROCEDURE — 84443 ASSAY THYROID STIM HORMONE: CPT

## 2019-10-02 PROCEDURE — 83735 ASSAY OF MAGNESIUM: CPT

## 2019-11-04 PROCEDURE — 93228 REMOTE 30 DAY ECG REV/REPORT: CPT | Performed by: INTERNAL MEDICINE

## 2019-11-15 ENCOUNTER — TELEPHONE (OUTPATIENT)
Dept: CARDIOLOGY CLINIC | Age: 69
End: 2019-11-15

## 2019-11-21 ENCOUNTER — OFFICE VISIT (OUTPATIENT)
Dept: CARDIOLOGY CLINIC | Age: 69
End: 2019-11-21
Payer: MEDICARE

## 2019-11-21 VITALS
DIASTOLIC BLOOD PRESSURE: 82 MMHG | HEIGHT: 74 IN | WEIGHT: 225.12 LBS | SYSTOLIC BLOOD PRESSURE: 166 MMHG | HEART RATE: 67 BPM | BODY MASS INDEX: 28.89 KG/M2

## 2019-11-21 DIAGNOSIS — I48.3 TYPICAL ATRIAL FLUTTER (HCC): Primary | ICD-10-CM

## 2019-11-21 DIAGNOSIS — R00.0 TACHYCARDIA: ICD-10-CM

## 2019-11-21 PROCEDURE — 93000 ELECTROCARDIOGRAM COMPLETE: CPT | Performed by: NURSE PRACTITIONER

## 2019-11-21 PROCEDURE — 99213 OFFICE O/P EST LOW 20 MIN: CPT | Performed by: NURSE PRACTITIONER

## 2019-11-21 RX ORDER — ASPIRIN 325 MG
325 TABLET, DELAYED RELEASE (ENTERIC COATED) ORAL EVERY 6 HOURS PRN
Qty: 30 TABLET | Refills: 0
Start: 2019-11-21

## 2021-06-10 ENCOUNTER — OFFICE VISIT (OUTPATIENT)
Dept: ENT CLINIC | Age: 71
End: 2021-06-10
Payer: MEDICARE

## 2021-06-10 VITALS
WEIGHT: 226 LBS | DIASTOLIC BLOOD PRESSURE: 78 MMHG | BODY MASS INDEX: 29.02 KG/M2 | HEART RATE: 54 BPM | SYSTOLIC BLOOD PRESSURE: 139 MMHG | TEMPERATURE: 96.9 F

## 2021-06-10 DIAGNOSIS — H90.3 BILATERAL HIGH FREQUENCY SENSORINEURAL HEARING LOSS: ICD-10-CM

## 2021-06-10 DIAGNOSIS — H93.13 SUBJECTIVE TINNITUS OF BOTH EARS: ICD-10-CM

## 2021-06-10 DIAGNOSIS — H61.23 IMPACTED CERUMEN OF BOTH EARS: Primary | ICD-10-CM

## 2021-06-10 PROCEDURE — 69210 REMOVE IMPACTED EAR WAX UNI: CPT | Performed by: OTOLARYNGOLOGY

## 2021-06-28 NOTE — PROGRESS NOTES
Chief Complaint   Patient presents with    Ear Problem     ear cleaning     Tinnitus     wondering about new rememdies       HISTORY:    Bernice Valdez stated that the hearing is decreased in both ears, which are plugged up with ear wax. EXAMINATION:    Both external ear canals were occluded by cerumen impaction, obscuring visualization of the TMs. PROCEDURE - REMOVAL OF BILATERAL CERUMEN IMPACTION:   The cerumen impaction was removed from both of the EACs, under otomicroscopy visualization, with instrumentation, using a Billeau wire loop. After successful cerumen removal, the EACs appeared to be normal and clear bilaterally without mass, exudate, or edema. The tympanic membranes appeared to be normal.  There was no evidence of acute disease. Bernice Valdez reported improved hearing, back to usual level, after cerumen removal.            IMPRESSION / Destinee Alas / Maria Eugenia Bullard / PROCEDURES:       Bernice Valdez was seen today for ear problem and tinnitus. Diagnoses and all orders for this visit:    Impacted cerumen of both ears    Subjective tinnitus of both ears    Bilateral high frequency sensorineural hearing loss         RECOMMENDATIONS / PLAN:    1. Unfortunately, there are no new remedies for tinnitus. 2. Return for recurrence of symptoms of excessive ear wax, or any further ear nose throat or sinus problems.

## 2021-09-25 NOTE — PROGRESS NOTES
[address] [place] [address] ENT        PCP:  Marissa Grover MD      CHIEF COMPLAINT:  Chief Complaint   Patient presents with    Hearing Loss       HISTORY OF PRESENT ILLNESS:   Bouchra Saez is a 77 y.o. male here for recheck and follow-up of a problem located in both ears. The quality is hearing loss. Since the last visit here, he has had no worsening of hearing. Some ringing in the ears, it seems the same or a little better than at the last visit. Associated symptoms include hearing loss and tinnitus. REVIEW OF SYSTEMS:   EARS, NOSE, THROAT:  Denied otorrhea, otalgia, epistaxis, nasal dyspnea, rhinorrhea, chronic sore throat and chronic hoarseness. EXAMINATION:      VITALS SIGNS reviewed. Vitals:    06/04/19 1310   BP: 133/81   Pulse: 50   Resp: 14   SpO2: 97%   Weight: 235 lb (106.6 kg)   Height: 6' 2\" (1.88 m)      GENERAL APPEARANCE:  Well developed, well nourished, no apparent distress, no apparent deformities. ABILITY TO COMMUNICATE/QUALITY OF VOICE:  Communicated without difficulty. Normal voice. No hot potato voice. No hoarseness. EXTERNAL EAR/NOSE:  Normal pinnae and mastoids. Normal external nose. EARS, OTOSCOPY: The TMs and EACs appeared to be normal bilaterally. NOSE:  The nasal septum was midline. The inferior turbinates were normal.  The nasal mucosa and secretions were normal.  No pus or polyps were seen. SINUSES:  The maxillary and frontal sinuses were nontender, bilaterally. LIPS, TEETH, AND GUMS:   Normal.     OROPHARYNX/ORAL CAVITY:  Oral mucosa, hard and soft palates, tongue, and pharynx were normal.      NECK:  No masses or tenderness. The laryngeal cartilages and hyoid bone were normal.  No abnormal appearance, asymmetry or abnormal tracheal position. PALPATION OF LYMPH NODES, CERVICAL, FACIAL AND SUPRACLAVICULAR:  No lymphadenopathy. IMPRESSION / Amairani Del Valle / Camelia Sellar / PROCEDURES:       Bouchra Saez was seen today for hearing loss.     Diagnoses and
I personally performed the service described in the documentation recorded by the scribe in my presence, and it accurately and completely records my words and actions.

## 2022-02-12 ENCOUNTER — TELEPHONE (OUTPATIENT)
Dept: CARDIOLOGY CLINIC | Age: 72
End: 2022-02-12

## 2022-02-13 NOTE — PROGRESS NOTES
Patient has heart rate of 190 which started about 2 hours ago. We did an ablation of SVT about 3 years ago. I asked him to go to ER to try cardioversion. I will follow him in office afterwards.

## 2022-02-14 ENCOUNTER — TELEPHONE (OUTPATIENT)
Dept: CARDIOLOGY CLINIC | Age: 72
End: 2022-02-14

## 2022-02-14 NOTE — TELEPHONE ENCOUNTER
----- Message from Jarrett Shannon MD sent at 2/13/2022 11:31 AM EST -----  Please set him up with an event monitor and have him see me this week or if he prefers to complete the event monitor first then see me after that thanks. He had tachycardia of 290 bpm over the weekend.

## 2022-02-14 NOTE — TELEPHONE ENCOUNTER
Spoke with the patient in regards to the message below .  He has been added to to the schedule with MXA on Thursday at 2:15

## 2022-02-14 NOTE — TELEPHONE ENCOUNTER
Please call and see if patient is willing to come in for an appointment with MXA on Thursday may use [phone] hold spot

## 2022-02-17 ENCOUNTER — OFFICE VISIT (OUTPATIENT)
Dept: CARDIOLOGY CLINIC | Age: 72
End: 2022-02-17
Payer: MEDICARE

## 2022-02-17 VITALS
WEIGHT: 217 LBS | HEIGHT: 74 IN | SYSTOLIC BLOOD PRESSURE: 138 MMHG | BODY MASS INDEX: 27.85 KG/M2 | DIASTOLIC BLOOD PRESSURE: 81 MMHG | HEART RATE: 59 BPM

## 2022-02-17 DIAGNOSIS — R00.0 TACHYCARDIA: ICD-10-CM

## 2022-02-17 DIAGNOSIS — I47.1 SVT (SUPRAVENTRICULAR TACHYCARDIA) (HCC): Primary | ICD-10-CM

## 2022-02-17 DIAGNOSIS — R00.1 BRADYCARDIA: ICD-10-CM

## 2022-02-17 DIAGNOSIS — I47.1 AVNRT (AV NODAL RE-ENTRY TACHYCARDIA) (HCC): ICD-10-CM

## 2022-02-17 PROCEDURE — 99214 OFFICE O/P EST MOD 30 MIN: CPT | Performed by: INTERNAL MEDICINE

## 2022-02-17 PROCEDURE — G8484 FLU IMMUNIZE NO ADMIN: HCPCS | Performed by: INTERNAL MEDICINE

## 2022-02-17 PROCEDURE — 3017F COLORECTAL CA SCREEN DOC REV: CPT | Performed by: INTERNAL MEDICINE

## 2022-02-17 PROCEDURE — 93000 ELECTROCARDIOGRAM COMPLETE: CPT | Performed by: INTERNAL MEDICINE

## 2022-02-17 PROCEDURE — 1036F TOBACCO NON-USER: CPT | Performed by: INTERNAL MEDICINE

## 2022-02-17 PROCEDURE — 4040F PNEUMOC VAC/ADMIN/RCVD: CPT | Performed by: INTERNAL MEDICINE

## 2022-02-17 PROCEDURE — 93228 REMOTE 30 DAY ECG REV/REPORT: CPT | Performed by: INTERNAL MEDICINE

## 2022-02-17 PROCEDURE — G8417 CALC BMI ABV UP PARAM F/U: HCPCS | Performed by: INTERNAL MEDICINE

## 2022-02-17 PROCEDURE — G8427 DOCREV CUR MEDS BY ELIG CLIN: HCPCS | Performed by: INTERNAL MEDICINE

## 2022-02-17 PROCEDURE — 1123F ACP DISCUSS/DSCN MKR DOCD: CPT | Performed by: INTERNAL MEDICINE

## 2022-02-17 NOTE — PROGRESS NOTES
[facility]   Electrophysiology Follow up   Date: 2/17/2022  I had the privilege of visiting Phoebe Hair in the office. CC: SVT  HPI: Phoebe Hair is a 80 y.o. male with a past medical history of SVT, hypercholesterolemia, elevated HGB A1C . Presented to ED 4/7/2018 for palpitations, CP, SOB, and feelings of dizziness after not sleeping well prior night. It is noted patient stated a history of -200 with SVT in past that he stated he thought was triggered by green tea and had no more episodes since. Denies pre-syncope or syncope episodes. He was drinking wine at the time the occurrence began. He did not follow up with cardiology for SVT in past.   He has recorded about 5 episodes since 4/2017 identified with Fitbit. He was able to use vagal maneuvers in past to cease or episodes did spontaneously convert. He has no past cardiac history other than bradycardia episodes in the 40-50's at rest. Not symptomatic with bradycardia. Patient denies lightheadedness, dizziness, palpitations, orthopnea, edema, presyncope or syncope. He is able to stop them with vagal maneuvers     He had an ablation 9/11/18 for AVNRt and atrial flutter  . He called office 10/16/2018 stating he had two \"episodes\" since his ablation, one on 9/28 and one on 10/2 after starting drinking coffee, both at 175 bpm and lasted 20 min. . Began drinking tea and coffee as usual before ablation.       Patient called in 02/12/2022 with heart rate of 190. Ramona Boyce presents today after an episode of rapid heart rate. He had covid in November and December. He had Covid in December. He sat in the sauna and then developed fast heart rate. He drank some electrolytes and did vagal maneuvers and it broke. This past weekend,he had another episode after drink 1-2 cups of coffee about 730 in the evening that lasted about an hour and a half. He had one episode a few years ago when he was on Keto diet. He resolved with drinking pickle juice. Assessment and plan:     SVT / palpitations    - Recent episodes of HR up to 190   - 09/11/2018 Ablation for SVT/AVNRT    - repeat monitor to identify rhythm. If there is nothing on monitor we can continue to monitor symptome. We Can consider ablation if recurrent or arrhythmia is identified on monitor.    - Lopressor 25 mg  BID PRN for rapid heart rate    He seems to have recurrent SVT. We will do a monitor to see if we can capture it. I gave him as needed Lopressor. If he has more recurrences we can consider another EP study and ablation. Bradycardia    - asymptomatic   - 92% of MPHR on GXT 2019      Plan:   Cardiac monitor  Lopressor 25 mg  PRN for rapid heart rate   Follow up in six months    Patient Active Problem List    Diagnosis Date Noted    Bradycardia 02/17/2022    Tachycardia 10/02/2019    Typical atrial flutter (HCC)     AVNRT (AV demetra re-entry tachycardia) ([facility] [address]     Bilateral high frequency sensorineural hearing loss 06/04/2018    Subjective tinnitus of both ears 26/73/0958    Umbilical hernia without obstruction and without gangrene      Diagnostic studies:   EKG today Sinus Arnold 58   QRS  QTC. Stress test 2/6/19  Summary   Normal LV size and systolic function.   Left ventricular ejection fraction of 55 %.   There is normal isotope uptake at stress and rest.   There is no evidence of myocardial ischemia or scar.         MCOT 10/26/18-11/24/18  Sinus rhythm with PAT and NSVT  HR  BPM with avg of 55 bpm   PVC And PAC 1%     Cardiac event monitor 7/5/2018:  CONCLUSION:  SINUS RHYTHM  SINUS BRADYCARDIA TO RATES AS LOW AS 38 BPM  TWO RUNS OF SVT TO RATES BETWEEN 180-200 BPM  SYMPTOMS WERE NOTED DURING ONE OF THE EPISODES THAT LASTED AROUND   1 MINUTE   THERE WERE SHORT RUNS OF SVT WITHOUT SYMPTOMS  PAC'S WERE NOTED AND SYMPTOMS WERE NOTED WITH THE PAC'S      Echo 8/8/2017:  - Left ventricle: The cavity size was mildly dilated.  Wall thickness    was increased in a pattern of · Psychiatric: Has a normal behavior       Review of System:  [x] Full ROS obtained and negative except as mentioned in HPI    Prior to Admission medications    Medication Sig Start Date End Date Taking? Authorizing Provider   metoprolol tartrate (LOPRESSOR) 25 MG tablet Take 1 tablet by mouth as needed (for rapid heart rate / SVT) 2/17/22  Yes Nilsa Che MD   aspirin 325 MG EC tablet Take 1 tablet by mouth every 6 hours as needed for Pain 11/21/19  Yes NANCIE Kasper - CNP   cetirizine (ZYRTEC) 10 MG tablet Take 10 mg by mouth as needed for Allergies   Yes Historical Provider, MD   terazosin (HYTRIN) 10 MG capsule Take 10 mg by mouth nightly   Yes Historical Provider, MD       Allergies   Allergen Reactions    Naproxen Hives    Aleve [Naproxen Sodium] Hives    Fluticasone      Other reaction(s): Other (see comments)  Thrush  Thrush    Ibuprofen     Neomycin-Bacitracin Zn-Polymyx      Other reaction(s): Other (See Comments)  Scars and wound does not heal   Other reaction(s): Other (see comments)  Scars and wound does not heal     Neosporin [Neomycin-Polymyxin-Gramicidin]     Nsaids        Social History:  Reviewed. reports that he quit smoking about 42 years ago. His smoking use included cigarettes. He started smoking about 53 years ago. He smoked 0.00 packs per day for 0.00 years. He has never used smokeless tobacco. He reports current alcohol use of about 2.0 standard drinks of alcohol per week. He reports that he does not use drugs. Family History:  Reviewed. Reviewed. No family history of SCD. Relevant and available labs, and cardiovascular diagnostics reviewed. Reviewed. I independently reviewed relevant and available cardiac diagnostic tests ECG, CXR, Echo, Stress test, Device interrogation, Holter, CT scan. Outside medical records via Care everywhere reviewed and summarized in H&P above.     Complex medical condition with multiple medical problems affecting prognosis and outcome of EP interventions       - The patient is counseled to follow a low salt diet to assure blood pressure remains controlled for cardiovascular risk factor modification.   - The patient is counseled to avoid excess caffeine, and energy drinks as this may exacerbated ectopy and arrhythmia. - The patient is counseled to get regular exercise 3-5 times per week to control cardiovascular risk factors. All questions and concerns were addressed to the patient/family. Alternatives to my treatment were discussed. I have discussed the above stated plan and the patient verbalized understanding and agreed with the plan. Scribe attestation: This note was scribed in the presence of  Lui Dickerson MD by Aracelis Fitzgerald RN    I, Dr. Lui Dickerson personally performed the services described in this documentation as scribed by RN in my presence, and it is both accurate and complete.         NOTE: This report was transcribed using voice recognition software. Every effort was made to ensure accuracy, however, inadvertent computerized transcription errors may be present.      Lui Dickerson MD, [facility] [address] [facility]   Office: [phone]  Fax: [phone]

## 2022-04-01 ENCOUNTER — TELEPHONE (OUTPATIENT)
Dept: CARDIOLOGY CLINIC | Age: 72
End: 2022-04-01

## 2022-04-01 NOTE — TELEPHONE ENCOUNTER
Pt called back stating his STV's has stopped once he took the beta blocker it took about 45 mins after he took it.     Pls advise thank you

## 2022-04-01 NOTE — TELEPHONE ENCOUNTER
This was the second episodes since removing the monitor. He states every time he does a monitor he has a reaction he gets a rash and this time he had hair loss. He does not to repeat monitor . He states his heart rate does not usually get below 45 and he did okay. No lightheadedness of dizziness. He would like to continue monitoring and will call if he decides to pursue ablation. Advised to call if he is symptomatic with his low heart rate or if his episodes become more frequent or are not relieved by PRN beta blocker.      Verbalized understanding

## 2022-04-01 NOTE — TELEPHONE ENCOUNTER
Hanane Atkins was wearing a 30 day heart monitor in which he experienced no issues. However, onc dani took it off, he has had 2 separate SVT's (03.21 and today 04.01) lasting 1.5 hours each. He wanted to give MXA an update and see if he should be concerned.     Please advise

## 2022-05-25 ENCOUNTER — HOSPITAL ENCOUNTER (OUTPATIENT)
Dept: CT IMAGING | Age: 72
Discharge: HOME | End: 2022-05-25
Payer: MEDICARE

## 2022-05-25 DIAGNOSIS — I47.1 PSVT (PAROXYSMAL SUPRAVENTRICULAR TACHYCARDIA) (HCC): ICD-10-CM

## 2022-05-25 PROCEDURE — 75571 CT HRT W/O DYE W/CA TEST: CPT

## 2022-08-01 ENCOUNTER — TELEPHONE (OUTPATIENT)
Dept: CARDIOLOGY CLINIC | Age: 72
End: 2022-08-01

## 2022-08-01 NOTE — TELEPHONE ENCOUNTER
Reviewed chart and sent letter. [place] let patient know that [employer], our  will be reaching out.      Mehdi Bee, APRN-CNP

## 2022-08-01 NOTE — TELEPHONE ENCOUNTER
Pt states he is experiencing more SVT episodes and was ready to pursue the ablation that mxa recommended at last appt.

## 2022-08-01 NOTE — LETTER
[employer]  EP Procedure Sheet    8/1/22  Pamela Corley  1950  EP Procedures  [] Pacemaker implant (single/dual) [x] EP Study   [] ICD implant (single/dual) [] Atrial flutter ablation (JESUS MANUEL Y/N)   [] Biv implant ICD [] Tilt Table   [] Biv implant PPM [] Atrial fibrillation ablation (JESUS MANUEL Yes)   [] Generator Change (PPM/ICD/BiV) [x] SVT ablation   [] Lead revision (RV/LA/RA) (<1 month) [] PVC ablation     [] Lead extraction +/- upgrade (BiV/PPM/ICD) [] VT Ischemic/ non-ischemic   [] Loop implant/ removal [] VT RVOT   [] Cardioversion [] VT Left sided   [] JESUS MANUEL [] AVN ablation   Equipment  [] [employer]  [] ROCK[employer] Mapping System   [] [employer] [] [address]   [] [employer] [] CryoAblation   [] Biotronik [] Laser Lead Extraction   EP Procedures Scheduling Request  # hours Requested  []1 [x]2 []2-4 [] 4-6 Scheduled  Date:   Specific Day  Completed    Anesthesia [x]yes []no F/u Date:   CT surgery backup []yes []no COVID     Overnight stay      Performing MD  []RMM [x]MXA   []MKW [] Other _______ First vs repeat  * []1st [x] 2nd [] 3rd   Pre-Procedure Labs / Imaging  [] PT/INR [] Type & cross   [] CBC [] Units PRBC   [] BMP/Mg [] Units FFP   [] Venogram [] Cardiac CTA for Pulmonary vein mapping     RN INITIALS: NPAL    Patient Instructions  Do not eat or drink after midnight the night prior to procedure  Dx: SVT  ICD-10 code: I47.1

## 2022-08-02 NOTE — TELEPHONE ENCOUNTER
Spoke with the patient and advised him of the message below  He voiced understanding .  Call complete

## 2022-08-15 ENCOUNTER — TELEPHONE (OUTPATIENT)
Dept: CARDIOLOGY CLINIC | Age: 72
End: 2022-08-15

## 2022-08-15 NOTE — TELEPHONE ENCOUNTER
Pt called wanting to know if his appt on 08/18 with mxa necessary to keep since he has scheduled the ablation for 11/30/2022. Please call Pt to advise him if he needs to keep the appt are it can be cancel.   Thank you

## 2022-08-16 ENCOUNTER — TELEPHONE (OUTPATIENT)
Dept: CARDIOLOGY CLINIC | Age: 72
End: 2022-08-16

## 2022-11-30 ENCOUNTER — ANESTHESIA EVENT (OUTPATIENT)
Dept: CARDIAC CATH/INVASIVE PROCEDURES | Age: 72
End: 2022-11-30
Payer: MEDICARE

## 2022-11-30 ENCOUNTER — HOSPITAL ENCOUNTER (OUTPATIENT)
Dept: CARDIAC CATH/INVASIVE PROCEDURES | Age: 72
Discharge: HOME | End: 2022-11-30
Attending: INTERNAL MEDICINE | Admitting: INTERNAL MEDICINE
Payer: MEDICARE

## 2022-11-30 ENCOUNTER — ANESTHESIA (OUTPATIENT)
Dept: CARDIAC CATH/INVASIVE PROCEDURES | Age: 72
End: 2022-11-30
Payer: MEDICARE

## 2022-11-30 VITALS
TEMPERATURE: 97 F | OXYGEN SATURATION: 96 % | WEIGHT: 218 LBS | BODY MASS INDEX: 27.98 KG/M2 | SYSTOLIC BLOOD PRESSURE: 117 MMHG | RESPIRATION RATE: 13 BRPM | HEIGHT: 74 IN | HEART RATE: 57 BPM | DIASTOLIC BLOOD PRESSURE: 71 MMHG

## 2022-11-30 LAB
ANION GAP SERPL CALCULATED.3IONS-SCNC: 9 MMOL/L (ref 3–16)
BUN BLDV-MCNC: 17 MG/DL (ref 7–20)
CALCIUM SERPL-MCNC: 10 MG/DL (ref 8.3–10.6)
CHLORIDE BLD-SCNC: 103 MMOL/L (ref 99–110)
CO2: 30 MMOL/L (ref 21–32)
CREAT SERPL-MCNC: 1 MG/DL (ref 0.8–1.3)
EKG ATRIAL RATE: 71 BPM
EKG DIAGNOSIS: NORMAL
EKG P AXIS: 19 DEGREES
EKG P-R INTERVAL: 166 MS
EKG Q-T INTERVAL: 408 MS
EKG QRS DURATION: 92 MS
EKG QTC CALCULATION (BAZETT): 443 MS
EKG R AXIS: -4 DEGREES
EKG T AXIS: 66 DEGREES
EKG VENTRICULAR RATE: 71 BPM
GFR SERPL CREATININE-BSD FRML MDRD: >60 ML/MIN/{1.73_M2}
GLUCOSE BLD-MCNC: 114 MG/DL (ref 70–99)
HCT VFR BLD CALC: 50 % (ref 40.5–52.5)
HEMOGLOBIN: 16.7 G/DL (ref 13.5–17.5)
MCH RBC QN AUTO: 30 PG (ref 26–34)
MCHC RBC AUTO-ENTMCNC: 33.3 G/DL (ref 31–36)
MCV RBC AUTO: 90 FL (ref 80–100)
PDW BLD-RTO: 13.7 % (ref 12.4–15.4)
PLATELET # BLD: 125 K/UL (ref 135–450)
PMV BLD AUTO: 8.9 FL (ref 5–10.5)
POC ACT LR: 359 SEC
POC ACT LR: 380 SEC
POTASSIUM SERPL-SCNC: 3.8 MMOL/L (ref 3.5–5.1)
RBC # BLD: 5.56 M/UL (ref 4.2–5.9)
SODIUM BLD-SCNC: 142 MMOL/L (ref 136–145)
WBC # BLD: 6.7 K/UL (ref 4–11)

## 2022-11-30 PROCEDURE — 3700000001 HC ADD 15 MINUTES (ANESTHESIA)

## 2022-11-30 PROCEDURE — 93462 L HRT CATH TRNSPTL PUNCTURE: CPT

## 2022-11-30 PROCEDURE — 85347 COAGULATION TIME ACTIVATED: CPT

## 2022-11-30 PROCEDURE — 3700000000 HC ANESTHESIA ATTENDED CARE

## 2022-11-30 PROCEDURE — 2580000003 HC RX 258

## 2022-11-30 PROCEDURE — 93005 ELECTROCARDIOGRAM TRACING: CPT

## 2022-11-30 PROCEDURE — 2580000003 HC RX 258: Performed by: NURSE ANESTHETIST, CERTIFIED REGISTERED

## 2022-11-30 PROCEDURE — 93623 PRGRMD STIMJ&PACG IV RX NFS: CPT

## 2022-11-30 PROCEDURE — 36415 COLL VENOUS BLD VENIPUNCTURE: CPT

## 2022-11-30 PROCEDURE — 6360000002 HC RX W HCPCS: Performed by: NURSE ANESTHETIST, CERTIFIED REGISTERED

## 2022-11-30 PROCEDURE — 93662 INTRACARDIAC ECG (ICE): CPT

## 2022-11-30 PROCEDURE — C1732 CATH, EP, DIAG/ABL, 3D/VECT: HCPCS

## 2022-11-30 PROCEDURE — C1894 INTRO/SHEATH, NON-LASER: HCPCS

## 2022-11-30 PROCEDURE — 93462 L HRT CATH TRNSPTL PUNCTURE: CPT | Performed by: INTERNAL MEDICINE

## 2022-11-30 PROCEDURE — 6360000002 HC RX W HCPCS

## 2022-11-30 PROCEDURE — 93653 COMPRE EP EVAL TX SVT: CPT

## 2022-11-30 PROCEDURE — 85027 COMPLETE CBC AUTOMATED: CPT

## 2022-11-30 PROCEDURE — 93653 COMPRE EP EVAL TX SVT: CPT | Performed by: INTERNAL MEDICINE

## 2022-11-30 PROCEDURE — 7100000001 HC PACU RECOVERY - ADDTL 15 MIN

## 2022-11-30 PROCEDURE — 80048 BASIC METABOLIC PNL TOTAL CA: CPT

## 2022-11-30 PROCEDURE — 93005 ELECTROCARDIOGRAM TRACING: CPT | Performed by: INTERNAL MEDICINE

## 2022-11-30 PROCEDURE — 93623 PRGRMD STIMJ&PACG IV RX NFS: CPT | Performed by: INTERNAL MEDICINE

## 2022-11-30 PROCEDURE — 93655 ICAR CATH ABLTJ DSCRT ARRHYT: CPT | Performed by: INTERNAL MEDICINE

## 2022-11-30 PROCEDURE — C1759 CATH, INTRA ECHOCARDIOGRAPHY: HCPCS

## 2022-11-30 PROCEDURE — 2500000003 HC RX 250 WO HCPCS: Performed by: NURSE ANESTHETIST, CERTIFIED REGISTERED

## 2022-11-30 PROCEDURE — C1893 INTRO/SHEATH, FIXED,NON-PEEL: HCPCS

## 2022-11-30 PROCEDURE — C1730 CATH, EP, 19 OR FEW ELECT: HCPCS

## 2022-11-30 PROCEDURE — 7100000000 HC PACU RECOVERY - FIRST 15 MIN

## 2022-11-30 PROCEDURE — C1760 CLOSURE DEV, VASC: HCPCS

## 2022-11-30 PROCEDURE — 93655 ICAR CATH ABLTJ DSCRT ARRHYT: CPT

## 2022-11-30 PROCEDURE — 2500000003 HC RX 250 WO HCPCS

## 2022-11-30 PROCEDURE — C1769 GUIDE WIRE: HCPCS

## 2022-11-30 PROCEDURE — 93662 INTRACARDIAC ECG (ICE): CPT | Performed by: INTERNAL MEDICINE

## 2022-11-30 RX ORDER — ACETAMINOPHEN 325 MG/1
650 TABLET ORAL EVERY 4 HOURS PRN
Status: DISCONTINUED | OUTPATIENT
Start: 2022-11-30 | End: 2022-11-30 | Stop reason: HOSPADM

## 2022-11-30 RX ORDER — ONDANSETRON 2 MG/ML
INJECTION INTRAMUSCULAR; INTRAVENOUS PRN
Status: DISCONTINUED | OUTPATIENT
Start: 2022-11-30 | End: 2022-11-30 | Stop reason: SDUPTHER

## 2022-11-30 RX ORDER — LIDOCAINE HYDROCHLORIDE 20 MG/ML
INJECTION, SOLUTION EPIDURAL; INFILTRATION; INTRACAUDAL; PERINEURAL PRN
Status: DISCONTINUED | OUTPATIENT
Start: 2022-11-30 | End: 2022-11-30

## 2022-11-30 RX ORDER — SODIUM CHLORIDE 0.9 % (FLUSH) 0.9 %
5-40 SYRINGE (ML) INJECTION PRN
Status: DISCONTINUED | OUTPATIENT
Start: 2022-11-30 | End: 2022-11-30 | Stop reason: HOSPADM

## 2022-11-30 RX ORDER — LIDOCAINE HYDROCHLORIDE 20 MG/ML
INJECTION, SOLUTION EPIDURAL; INFILTRATION; INTRACAUDAL; PERINEURAL PRN
Status: DISCONTINUED | OUTPATIENT
Start: 2022-11-30 | End: 2022-11-30 | Stop reason: SDUPTHER

## 2022-11-30 RX ORDER — DEXAMETHASONE SODIUM PHOSPHATE 4 MG/ML
INJECTION, SOLUTION INTRA-ARTICULAR; INTRALESIONAL; INTRAMUSCULAR; INTRAVENOUS; SOFT TISSUE PRN
Status: DISCONTINUED | OUTPATIENT
Start: 2022-11-30 | End: 2022-11-30 | Stop reason: SDUPTHER

## 2022-11-30 RX ORDER — SODIUM CHLORIDE 9 MG/ML
INJECTION, SOLUTION INTRAVENOUS PRN
Status: DISCONTINUED | OUTPATIENT
Start: 2022-11-30 | End: 2022-11-30 | Stop reason: HOSPADM

## 2022-11-30 RX ORDER — FENTANYL CITRATE 50 UG/ML
INJECTION, SOLUTION INTRAMUSCULAR; INTRAVENOUS PRN
Status: DISCONTINUED | OUTPATIENT
Start: 2022-11-30 | End: 2022-11-30 | Stop reason: SDUPTHER

## 2022-11-30 RX ORDER — SODIUM CHLORIDE 0.9 % (FLUSH) 0.9 %
5-40 SYRINGE (ML) INJECTION EVERY 12 HOURS SCHEDULED
Status: DISCONTINUED | OUTPATIENT
Start: 2022-11-30 | End: 2022-11-30 | Stop reason: HOSPADM

## 2022-11-30 RX ORDER — VECURONIUM BROMIDE 1 MG/ML
INJECTION, POWDER, LYOPHILIZED, FOR SOLUTION INTRAVENOUS PRN
Status: DISCONTINUED | OUTPATIENT
Start: 2022-11-30 | End: 2022-11-30 | Stop reason: SDUPTHER

## 2022-11-30 RX ORDER — PROPOFOL 10 MG/ML
INJECTION, EMULSION INTRAVENOUS PRN
Status: DISCONTINUED | OUTPATIENT
Start: 2022-11-30 | End: 2022-11-30 | Stop reason: SDUPTHER

## 2022-11-30 RX ORDER — SODIUM CHLORIDE 9 MG/ML
INJECTION, SOLUTION INTRAVENOUS CONTINUOUS PRN
Status: DISCONTINUED | OUTPATIENT
Start: 2022-11-30 | End: 2022-11-30 | Stop reason: SDUPTHER

## 2022-11-30 RX ORDER — SUCCINYLCHOLINE/SOD CL,ISO/PF 200MG/10ML
SYRINGE (ML) INTRAVENOUS PRN
Status: DISCONTINUED | OUTPATIENT
Start: 2022-11-30 | End: 2022-11-30 | Stop reason: SDUPTHER

## 2022-11-30 RX ORDER — HEPARIN SODIUM 1000 [USP'U]/ML
INJECTION, SOLUTION INTRAVENOUS; SUBCUTANEOUS PRN
Status: DISCONTINUED | OUTPATIENT
Start: 2022-11-30 | End: 2022-11-30 | Stop reason: SDUPTHER

## 2022-11-30 RX ADMIN — DEXAMETHASONE SODIUM PHOSPHATE 8 MG: 4 INJECTION, SOLUTION INTRAMUSCULAR; INTRAVENOUS at 07:46

## 2022-11-30 RX ADMIN — PHENYLEPHRINE HYDROCHLORIDE 200 MCG: 10 INJECTION INTRAVENOUS at 09:55

## 2022-11-30 RX ADMIN — VECURONIUM BROMIDE 5 MG: 1 INJECTION, POWDER, LYOPHILIZED, FOR SOLUTION INTRAVENOUS at 09:21

## 2022-11-30 RX ADMIN — VECURONIUM BROMIDE 4 MG: 1 INJECTION, POWDER, LYOPHILIZED, FOR SOLUTION INTRAVENOUS at 11:01

## 2022-11-30 RX ADMIN — PROPOFOL 200 MG: 10 INJECTION, EMULSION INTRAVENOUS at 07:40

## 2022-11-30 RX ADMIN — VECURONIUM BROMIDE 3 MG: 1 INJECTION, POWDER, LYOPHILIZED, FOR SOLUTION INTRAVENOUS at 10:27

## 2022-11-30 RX ADMIN — SODIUM CHLORIDE: 9 INJECTION, SOLUTION INTRAVENOUS at 11:19

## 2022-11-30 RX ADMIN — PHENYLEPHRINE HYDROCHLORIDE 200 MCG: 10 INJECTION INTRAVENOUS at 08:46

## 2022-11-30 RX ADMIN — ONDANSETRON 4 MG: 2 INJECTION INTRAMUSCULAR; INTRAVENOUS at 07:46

## 2022-11-30 RX ADMIN — ISOPROTERENOL HYDROCHLORIDE 3 MCG/MIN: 0.2 INJECTION, SOLUTION INTRAMUSCULAR; INTRAVENOUS at 08:17

## 2022-11-30 RX ADMIN — PHENYLEPHRINE HYDROCHLORIDE 200 MCG: 10 INJECTION INTRAVENOUS at 10:36

## 2022-11-30 RX ADMIN — VECURONIUM BROMIDE 10 MG: 1 INJECTION, POWDER, LYOPHILIZED, FOR SOLUTION INTRAVENOUS at 07:45

## 2022-11-30 RX ADMIN — PHENYLEPHRINE HYDROCHLORIDE 200 MCG: 10 INJECTION INTRAVENOUS at 09:24

## 2022-11-30 RX ADMIN — LIDOCAINE HYDROCHLORIDE 100 MG: 20 INJECTION, SOLUTION EPIDURAL; INFILTRATION; INTRACAUDAL; PERINEURAL at 07:40

## 2022-11-30 RX ADMIN — VECURONIUM BROMIDE 5 MG: 1 INJECTION, POWDER, LYOPHILIZED, FOR SOLUTION INTRAVENOUS at 08:36

## 2022-11-30 RX ADMIN — FENTANYL CITRATE 50 MCG: 50 INJECTION, SOLUTION INTRAMUSCULAR; INTRAVENOUS at 11:51

## 2022-11-30 RX ADMIN — VECURONIUM BROMIDE 3 MG: 1 INJECTION, POWDER, LYOPHILIZED, FOR SOLUTION INTRAVENOUS at 09:53

## 2022-11-30 RX ADMIN — FENTANYL CITRATE 50 MCG: 50 INJECTION, SOLUTION INTRAMUSCULAR; INTRAVENOUS at 07:40

## 2022-11-30 RX ADMIN — PHENYLEPHRINE HYDROCHLORIDE 200 MCG: 10 INJECTION INTRAVENOUS at 11:06

## 2022-11-30 RX ADMIN — VECURONIUM BROMIDE 5 MG: 1 INJECTION, POWDER, LYOPHILIZED, FOR SOLUTION INTRAVENOUS at 11:37

## 2022-11-30 RX ADMIN — HEPARIN SODIUM 12000 UNITS: 1000 INJECTION INTRAVENOUS; SUBCUTANEOUS at 11:00

## 2022-11-30 RX ADMIN — SUGAMMADEX 200 MG: 100 INJECTION, SOLUTION INTRAVENOUS at 11:43

## 2022-11-30 RX ADMIN — PHENYLEPHRINE HYDROCHLORIDE 30 MCG/MIN: 10 INJECTION INTRAVENOUS at 08:22

## 2022-11-30 RX ADMIN — SODIUM CHLORIDE: 9 INJECTION, SOLUTION INTRAVENOUS at 07:34

## 2022-11-30 RX ADMIN — Medication 170 MG: at 07:40

## 2022-11-30 ASSESSMENT — LIFESTYLE VARIABLES: SMOKING_STATUS: 0

## 2022-11-30 NOTE — H&P
[facility]   Electrophysiology      CC: SVT  HPI: Charissa Hamm is a 80 y.o. male with a past medical history of SVT, hypercholesterolemia, elevated HGB A1C . Presented to ED 4/7/2018 for palpitations, CP, SOB, and feelings of dizziness after not sleeping well prior night. It is noted patient stated a history of -200 with SVT in past that he stated he thought was triggered by green tea and had no more episodes since. Denies pre-syncope or syncope episodes. He was drinking wine at the time the occurrence began. He did not follow up with cardiology for SVT in past.   He has recorded about 5 episodes since 4/2017 identified with Fitbit. He was able to use vagal maneuvers in past to cease or episodes did spontaneously convert. He has no past cardiac history other than bradycardia episodes in the 40-50's at rest. Not symptomatic with bradycardia. Patient denies lightheadedness, dizziness, palpitations, orthopnea, edema, presyncope or syncope. He is able to stop them with vagal maneuvers     He had an ablation 9/11/18 for AVNRt and atrial flutter  . He called office 10/16/2018 stating he had two \"episodes\" since his ablation, one on 9/28 and one on 10/2 after starting drinking coffee, both at 175 bpm and lasted 20 min. . Began drinking tea and coffee as usual before ablation. Patient called in 02/12/2022 with heart rate of 190. He had covid in November and December. He had Covid in December. He sat in the sauna and then developed fast heart rate. He drank some electrolytes and did vagal maneuvers and it broke. This past weekend,he had another episode after drink 1-2 cups of coffee about 730 in the evening that lasted about an hour and a half. He had one episode a few years ago when he was on Keto diet. Recurrent SVT    Assessment and plan:     SVT / palpitations    - Recent episodes of HR up to 190   - 09/11/2018 Ablation for SVT/AVNRT    - repeat monitor to identify rhythm.  If there is nothing on monitor we can continue to monitor symptome. We Can consider ablation if recurrent or arrhythmia is identified on monitor.    - Lopressor 25 mg  BID PRN for rapid heart rate    He seems to have recurrent SVT. We will do a monitor to see if we can capture it. I gave him as needed Lopressor. If he has more recurrences we can consider another EP study and ablation. Recurrent SVT    The risks, benefits and alternatives of the ablation procedure were discussed with the patient. The risks including, but not limited to, the risks of bleeding, infection, radiation exposure, injury to vascular, cardiac and surrounding structures (including pneumothorax), stroke, cardiac perforation, tamponade, need for emergent open heart surgery, need for pacemaker implantation, Injury to the phrenic nerve, injury to the esophagus, myocardial infarction and death were discussed in detail. The patient opted to proceed with the ablation. Bradycardia    - asymptomatic   - 92% of MPHR on GXT 2019      Plan:   electrophysiological study(EPS) and possible Radiofrequency ablation      Patient Active Problem List    Diagnosis Date Noted    Bradycardia 02/17/2022    Tachycardia 10/02/2019    Typical atrial flutter (HCC)     AVNRT (AV demetra re-entry tachycardia) ([facility] [address]     Bilateral high frequency sensorineural hearing loss 06/04/2018    Subjective tinnitus of both ears 39/72/1758    Umbilical hernia without obstruction and without gangrene      Diagnostic studies:   EKG today Sinus Arnold 58   QRS  QTC. Stress test 2/6/19  Summary   Normal LV size and systolic function. Left ventricular ejection fraction of 55 %. There is normal isotope uptake at stress and rest.   There is no evidence of myocardial ischemia or scar.          MCOT 10/26/18-11/24/18  Sinus rhythm with PAT and NSVT  HR  BPM with avg of 55 bpm   PVC And PAC 1%     Cardiac event monitor 7/5/2018:  CONCLUSION:  SINUS RHYTHM  SINUS BRADYCARDIA TO RATES AS LOW AS 38 BPM  TWO RUNS OF SVT TO RATES BETWEEN 180-200 BPM  SYMPTOMS WERE NOTED DURING ONE OF THE EPISODES THAT LASTED AROUND   1 MINUTE   THERE WERE SHORT RUNS OF SVT WITHOUT SYMPTOMS  PAC'S WERE NOTED AND SYMPTOMS WERE NOTED WITH THE PAC'S      Echo 8/8/2017:  - Left ventricle: The cavity size was mildly dilated. Wall thickness    was increased in a pattern of mild LVH. Systolic function was    normal. The estimated ejection fraction was in the range of 55% to    65%. Wall motion was normal; there were no regional wall motion    abnormalities. - Right ventricle: Systolic function was normal. TAPSE: 2.9cm.  - Right atrium: The atrium was mildly dilated. Echo 11/4/2010:  - Left ventricle: The cavity size was mildly to moderately dilated. Wall thickness was normal. Systolic function was normal. The     estimated ejection fraction was in the range of 55% to 60%. Wall     motion was normal; there were no regional wall motion     abnormalities. Left ventricular diastolic function parameters     were otherwise normal.   - Mitral valve: Mild regurgitation.   - Left atrium: The atrium was mildly dilated. - Right ventricle: The cavity size was mildly dilated. - Pulmonic valve: Peak gradient: 4mm Hg (S). I independently reviewed the cardiac diagnostic studies, ECG and relevant imaging studies. Lab Results   Component Value Date    LVEF 55 02/06/2019     No results found for: TSHFT4, TSH      Physical Examination:  Vitals:    02/17/22 1410   BP: 138/81   Pulse: 59      Wt Readings from Last 3 Encounters:   02/17/22 217 lb (98.4 kg)   06/10/21 226 lb (102.5 kg)   11/21/19 225 lb 1.9 oz (102.1 kg)       Constitutional: Oriented. No distress. Head: Normocephalic and atraumatic. Mouth/Throat: Oropharynx is clear and moist.   Eyes: Conjunctivae normal. EOM are normal.   Neck: Neck supple. No rigidity. No JVD present. Cardiovascular: Normal rate, regular rhythm, S1&S2. Pulmonary/Chest: Bilateral respiratory sounds. No wheezes, No rhonchi. Abdominal: Soft. Bowel sounds present. No distension, No tenderness. Musculoskeletal: No tenderness. No edema    Lymphadenopathy: Has no cervical adenopathy. Neurological: Alert and oriented. Cranial nerve appears intact, No Gross deficit   Skin: Skin is warm and dry. No rash noted. Psychiatric: Has a normal behavior       Review of System:  [x] Full ROS obtained and negative except as mentioned in HPI    Prior to Admission medications    Medication Sig Start Date End Date Taking? Authorizing Provider   metoprolol tartrate (LOPRESSOR) 25 MG tablet Take 1 tablet by mouth as needed (for rapid heart rate / SVT) 2/17/22  Yes Valery Jones MD   aspirin 325 MG EC tablet Take 1 tablet by mouth every 6 hours as needed for Pain 11/21/19  Yes NANCIE Coulter - CNP   cetirizine (ZYRTEC) 10 MG tablet Take 10 mg by mouth as needed for Allergies   Yes Historical Provider, MD   terazosin (HYTRIN) 10 MG capsule Take 10 mg by mouth nightly   Yes Historical Provider, MD       Allergies   Allergen Reactions    Naproxen Hives    Aleve [Naproxen Sodium] Hives    Fluticasone      Other reaction(s): Other (see comments)  Thrush  Thrush    Ibuprofen     Neomycin-Bacitracin Zn-Polymyx      Other reaction(s): Other (See Comments)  Scars and wound does not heal   Other reaction(s): Other (see comments)  Scars and wound does not heal     Neosporin [Neomycin-Polymyxin-Gramicidin]     Nsaids        Social History:  Reviewed. reports that he quit smoking about 42 years ago. His smoking use included cigarettes. He started smoking about 53 years ago. He smoked 0.00 packs per day for 0.00 years. He has never used smokeless tobacco. He reports current alcohol use of about 2.0 standard drinks of alcohol per week. He reports that he does not use drugs. Family History:  Reviewed. Reviewed. No family history of SCD.       Relevant and available labs, and cardiovascular diagnostics reviewed. Reviewed.

## 2022-11-30 NOTE — DISCHARGE INSTRUCTIONS
ABLATION DISCHARGE INSTRUCTIONS    Care of your puncture site:  Remove bandage 24 hours after the procedure. May shower in 24 hours but do not sit in a bathtub/pool of water for 3 days or until the wound is healed. Inspect the site daily and gently clean using soap and water while standing in the shower. Dry thoroughly and apply a Band-Aid that covers the entire site. Do not apply powder or lotion. Normal Observations:  Soreness or tenderness which may last one week. Mild oozing from the incision site. Possible bruising that could last 2 weeks. Activity:  You may resume driving 24 hours following the procedure. You may resume normal activity in 3 days or after the wound heals. Avoid lifting more than 10 pounds for 3 days or until the wound heals. Avoid strenuous exercise or activity for 1 week. Nutrition:  Regular diet       Call your doctor immediately if your condition worsens, for any other concerns, for a follow-up appointment or if you experience any of the following:  Significant bleeding that does not stop after 10 minutes of applying firm pressure on the puncture site. Increased swelling on the groin or leg. Unusual pain, numbness, or tingling of the groin or down the leg. Any signs of infection such as: redness, yellow drainage at the site, swelling or pain.

## 2022-11-30 NOTE — PROGRESS NOTES
Pt awake resting in bed, VSS- on RA satting low to mid 90s. Sinus josep to NSR on monitor. Dressing to R groin CDI with no drainage. Neurovascular checks WNL at this time. Pt denies pain and nausea- tolerating ice chips. Pt remains in supine position.  Phase one criteria met, will transfer back to cath lab for discharge

## 2022-11-30 NOTE — PROGRESS NOTES
Pt to PACU from cath lab, arouses to voice. VSS- on 6L simple mask satting 100%. NSR on monitor. Dressing to R groin CDI with no drainage. Neurovascular checks WNL at this time with R pedal pulse palpable. Pt in supine position. Denies pain.  Will continue to monitor

## 2022-11-30 NOTE — ANESTHESIA POSTPROCEDURE EVALUATION
Department of Anesthesiology  Postprocedure Note    Patient: Josh Sahni  MRN: [id number]  YOB: 1950  Date of evaluation: 11/30/2022      Procedure Summary     Date: 11/30/22 Room / Location: [facility] Cath Lab    Anesthesia Start: 2742 Anesthesia Stop: 5586    Procedure: ABLATION Diagnosis: Supraventricular tachycardia    Scheduled Providers:  Responsible Provider: Severa Murdoch, MD    Anesthesia Type: general ASA Status: 3          Anesthesia Type: No value filed.     Clark Phase I: Clark Score: 8    Clark Phase II:        Anesthesia Post Evaluation    Patient location during evaluation: PACU  Patient participation: complete - patient participated  Level of consciousness: awake and alert  Airway patency: patent  Nausea & Vomiting: no vomiting and no nausea  Complications: no  Cardiovascular status: hemodynamically stable  Respiratory status: acceptable  Hydration status: stable

## 2022-11-30 NOTE — PROCEDURES
[facility]     Electrophysiology Procedure Note       Date of Procedure: 11/30/2022  Patient's Name: Parish Alcaraz  YOB: 1950   Medical Record Number: [id number]  Referring Physician: Sonido Santo MD  Procedure Performed by: Sonido Santo MD    Procedure performed:    Comprehensive electrophysiological study with attempted induction of arrhythmia at baseline. Attempted induction of arrhythmia after IV drug infusion. Three-dimensional electroanatomic mapping of the right atrium  Left atrial recording and mapping via coronary sinus   Radiofrequency ablation of SVT, AV demetra re-entry. Radiofrequency ablation of typical atrial flutter as a separate mechanism  Transseptal catheterization via intact septum  Intracardiac echocardiography  General anesthesia      This was a very complex and difficult and prolonged procedure. Please see the details below. Indications for procedure:      with PMH of documented symptomatic SVT. Patient has had an ablation in 2018 but has had recurrence. Details of Procedure: The risks, benefits and alternatives of the ablation procedure were discussed with the patient. The risks including, but not limited to, the risks of bleeding, infection, radiation exposure, injury to vascular, cardiac and surrounding structures (including pneumothorax), stroke, cardiac perforation, tamponade, need for emergent open heart surgery, need for pacemaker implantation, myocardial infarction and death were discussed in detail. The patient opted to proceed with the ablation. Written informed consent was signed and placed in the chart. The patient was brought to the electrophysiology lab in a fasting nonsedated state. Patient was put under general anesthesia. Both groins were prepped and draped in the usual sterile fashion. After injection of 2% lidocaine in the right groin, an 8 Western Heidi, 6F sheaths and an SRO sheath were introduced to the right femoral vein . consistent with block over lower pathway. 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 Mapping and Ablation: Then three-dimensional mapping system (Carto navigation system)  was used and a 3D electroanatomical map of the right atrium including His bundle and CS location was created. Right femoral sheath was exchanged to a long SRO sheath. A 4mm ST SF ablation catheter was advanced into position along the septal aspect of the tricuspid valve under  3D mapping guidance. Electrophysiologic mapping was performed to localize an area on the anterior lip of the coronary sinus ostium where an atrial-ventricular ration of more than 1:3 could be obtained. Following identification of this area, radiofrequency lesions were delivered (48 W,  )  with demonstration of accelerated junctional rhythm with maintenance of antegrade and retrograde conduction. Several lesions were given. However tachycardia remained inducible. Multiple other lesions were given from tricuspid annulus to the coronary sinus ostium and from coronary sinus ostium to IVC. Tachycardia remained inducible. 10 lesions were given inside the coronary sinus at the roof of coronary sinus and the inside the ostium of coronary sinus. However this was not successful. We could not deliver more lesions more anteriorly due to proximity to His bundle. Rhythm was mapped during tachycardia which showed initially an area of early activation at the os of coronary sinus. Ablation of this location did not change the inducibility. Later this area of early activation was found to be just behind the His bundle recordings. Considering a possibility of left side extension of slow pathway we proceeded with ablation on the left side. At this point we proceeded with transseptal catheterization. Intracardiac echocardiography was used to map the septum.   Using a deflectable small curve sheath transseptal catheterization was done with pressure monitoring and ultrasound guidance using an RF wire. Area opposite of the slow pathway in the left side was ablated with few lesions. Patient also had inducible atrial flutter. Cycle length of atrial flutter was 290 ms. Radiofrequency lesions were given from the tricuspid valve towards IVC in a linear fashion. Lesions given near tricuspid annulus resulted in termination of flutter and achievement of bidirectional block. We also tried to induced the tachycardia by atrial and ventricular extra-stimuli which showed the disappearance of AH jump and no re-entrant tachycardia was induced. Post ablation and on Isuprel        Catheters and sheaths were removed. Sheaths were removed and hemostasis achieved with Perclose. The patient tolerated the procedure well and there were no complications. .  Patient was transported to the holding area in stable condition. Blood AVTT<11 cc  No complication  Conclusion:  Ablation of AV demetra re-entrant tachycardia. Ablation of isthmus dependent typical atrial flutter  PLAN:    Patient will be observed for short while and will be discharged home tomorrow if remains stable. Patient will receive usual post ablation care. I will monitor the patient over time. If he has recurrence of SVT, we may bring him back with the primary intention ablation of the left side. I also discussed use of antiarrhythmic such as flecainide or propafenone. I also discussed that either with more aggressive ablation of his AVNRT he may develop heart block and need a pacemaker or we can proceed with a pacemaker due to his baseline bradycardia and then he was antiarrhythmic medication in addition to the pacemaker to help control his SVT. We will decide about any of these options depending on his clinical course.

## 2022-11-30 NOTE — ANESTHESIA PRE PROCEDURE
Department of Anesthesiology  Preprocedure Note       Name:  Matt Schneider   Age:  71 y.o.  :  1950                                          MRN:  [id number]         Date:  2022      Surgeon: * No surgeons listed *    Procedure: * No procedures listed *    Medications prior to admission:   Prior to Admission medications    Medication Sig Start Date End Date Taking? Authorizing Provider   metoprolol tartrate (LOPRESSOR) 25 MG tablet Take 1 tablet by mouth as needed (for rapid heart rate / SVT) 22   Micha Morataya MD   aspirin 325 MG EC tablet Take 1 tablet by mouth every 6 hours as needed for Pain 19   NANCIE Lyons - CNP   cetirizine (ZYRTEC) 10 MG tablet Take 10 mg by mouth as needed for Allergies    Historical Provider, MD   terazosin (HYTRIN) 10 MG capsule Take 10 mg by mouth nightly    Historical Provider, MD       Current medications:    Current Outpatient Medications   Medication Sig Dispense Refill    metoprolol tartrate (LOPRESSOR) 25 MG tablet Take 1 tablet by mouth as needed (for rapid heart rate / SVT) 30 tablet 1    aspirin 325 MG EC tablet Take 1 tablet by mouth every 6 hours as needed for Pain 30 tablet 0    cetirizine (ZYRTEC) 10 MG tablet Take 10 mg by mouth as needed for Allergies      terazosin (HYTRIN) 10 MG capsule Take 10 mg by mouth nightly       No current facility-administered medications for this encounter. Allergies: Allergies   Allergen Reactions    Naproxen Hives    Aleve [Naproxen Sodium] Hives    Fluticasone      Other reaction(s): Other (see comments)  Thrush  Thrush    Ibuprofen     Neomycin-Bacitracin Zn-Polymyx      Other reaction(s): Other (See Comments)  Scars and wound does not heal   Other reaction(s):  Other (see comments)  Scars and wound does not heal     Neosporin [Neomycin-Polymyxin-Gramicidin]     Nsaids        Problem List:    Patient Active Problem List   Diagnosis Code    Umbilical hernia without obstruction and without gangrene K42.9    Bilateral high frequency sensorineural hearing loss H90.3    Subjective tinnitus of both ears H93.13    Typical atrial flutter (HCC) I48.3    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    Tachycardia R00.0    Bradycardia R00.1       Past Medical History:        Diagnosis Date    BPH (benign prostatic hyperplasia)     Bradycardia     Cancer (HCC)     skin ca removed    SVT (supraventricular tachycardia) ([facility] [address]        Past Surgical History:        Procedure Laterality Date    KNEE SURGERY Left 2016    TONSILLECTOMY         Social History:    Social History     Tobacco Use    Smoking status: Former     Packs/day: 0.00     Years: 0.00     Pack years: 0.00     Types: Cigarettes     Start date:      Quit date: 1980     Years since quittin.9    Smokeless tobacco: Never   Substance Use Topics    Alcohol use: Yes     Alcohol/week: 2.0 standard drinks     Types: 2 Glasses of wine per week     Comment: 3 times per week                                Counseling given: Not Answered      Vital Signs (Current): There were no vitals filed for this visit.                                            BP Readings from Last 3 Encounters:   22 138/81   06/10/21 139/78   19 (!) 166/82       NPO Status:                                                                                 BMI:   Wt Readings from Last 3 Encounters:   22 217 lb (98.4 kg)   06/10/21 226 lb (102.5 kg)   19 225 lb 1.9 oz (102.1 kg)     There is no height or weight on file to calculate BMI.    CBC:   Lab Results   Component Value Date/Time    WBC 9.4 10/02/2019 03:27 PM    RBC 5.34 10/02/2019 03:27 PM    HGB 16.8 10/02/2019 03:27 PM    HCT 48.2 10/02/2019 03:27 PM    MCV 90.2 10/02/2019 03:27 PM    RDW 13.6 10/02/2019 03:27 PM     10/02/2019 03:27 PM       CMP:   Lab Results   Component Value Date/Time     10/02/2019 03:27 PM    K 4.5 10/02/2019 03:27 PM     10/02/2019 03:27 PM    CO2 21 10/02/2019 03:27 PM    BUN 13 10/02/2019 03:27 PM    CREATININE 0.9 10/02/2019 03:27 PM    GFRAA >60 10/02/2019 03:27 PM    AGRATIO 1.4 04/07/2018 08:22 AM    LABGLOM >60 10/02/2019 03:27 PM    GLUCOSE 105 10/02/2019 03:27 PM    PROT 7.3 04/07/2018 08:22 AM    CALCIUM 9.7 10/02/2019 03:27 PM    BILITOT 0.5 04/07/2018 08:22 AM    ALKPHOS 57 04/07/2018 08:22 AM    AST 24 04/07/2018 08:22 AM    ALT 22 04/07/2018 08:22 AM       POC Tests: No results for input(s): POCGLU, POCNA, POCK, POCCL, POCBUN, POCHEMO, POCHCT in the last 72 hours.     Coags:   Lab Results   Component Value Date/Time    PROTIME 12.2 04/07/2018 08:22 AM    INR 1.08 04/07/2018 08:22 AM       HCG (If Applicable): No results found for: PREGTESTUR, PREGSERUM, HCG, HCGQUANT     ABGs: No results found for: PHART, PO2ART, JFP5XHO, DNA5FYL, BEART, O8YFMZHD     Type & Screen (If Applicable):  No results found for: LABABO, LABRH    Drug/Infectious Status (If Applicable):  No results found for: HIV, HEPCAB    COVID-19 Screening (If Applicable): No results found for: COVID19        Anesthesia Evaluation  Patient summary reviewed and Nursing notes reviewed no history of anesthetic complications:   Airway: Mallampati: III  TM distance: <3 FB   Neck ROM: full  Mouth opening: > = 3 FB   Dental: normal exam   (+) partials      Pulmonary:normal exam  breath sounds clear to auscultation      (-) COPD, asthma, sleep apnea and not a current smoker (former)                           Cardiovascular:    (+) hypertension:, dysrhythmias (ablation 9/11/18 for AVNRt and atrial flutter; recurrence): atrial flutter and SVT,     (-) past MI, CAD (neg stress test nml ef 2019), CABG/stent,  angina and  CHF    ECG reviewed  Rhythm: regular  Rate: normal    Stress test reviewed       Beta Blocker:  Dose within 24 Hrs         Neuro/Psych:   Negative Neuro/Psych ROS     (-) seizures, TIA and CVA           GI/Hepatic/Renal: Neg GI/Hepatic/Renal ROS       (-) GERD, liver disease and no renal disease       Endo/Other: Negative Endo/Other ROS       (-) diabetes mellitus, hypothyroidism, hyperthyroidism               Abdominal:             Vascular: Other Findings:           Anesthesia Plan      general     ASA 3       Induction: intravenous. MIPS: Postoperative opioids intended and Prophylactic antiemetics administered. Anesthetic plan and risks discussed with patient. Plan discussed with CRNA.                     Nicole Lundberg MD   11/30/2022

## 2023-02-07 NOTE — PROGRESS NOTES
[facility]   Electrophysiology Follow up   Date: 2/8/2023  I had the privilege of visiting Luis Carlos Escobar in the office. CC: SVT  HPI: Luis Carlos Escobar is a 71 y.o. male with a past medical history of SVT, hypercholesterolemia, elevated HGB A1C . Presented to ED 4/7/2018 for palpitations, CP, SOB, and feelings of dizziness after not sleeping well prior night. It is noted patient stated a history of -200 with SVT in past that he stated he thought was triggered by green tea and had no more episodes since. Denies pre-syncope or syncope episodes. He was drinking wine at the time the occurrence began. He did not follow up with cardiology for SVT in past.   He has recorded about 5 episodes since 4/2017 identified with Fitbit. He was able to use vagal maneuvers in past to cease or episodes did spontaneously convert. He has no past cardiac history other than bradycardia episodes in the 40-50's at rest. Not symptomatic with bradycardia. Patient denies lightheadedness, dizziness, palpitations, orthopnea, edema, presyncope or syncope. He is able to stop them with vagal maneuvers     He had an ablation 9/11/18 for AVNRt and atrial flutter  . He called office 10/16/2018 stating he had two \"episodes\" since his ablation, one on 9/28 and one on 10/2 after starting drinking coffee, both at 175 bpm and lasted 20 min. . Began drinking tea and coffee as usual before ablation. Patient called in 02/12/2022 with heart rate of 190. S/p 11/30/2022: Ablation of AV demetra re-entrant tachycardia. Ablation of isthmus dependent typical atrial flutter    Interval History:  Destiny Orellana presents to the office in follow up. He is doing well from a cardiac standpoint. Patient denies lightheadedness, dizziness, chest pain, palpitations, orthopnea, edema, presyncope or syncope. He will continue to work out and monitor that he can reach a minimum HR of 115-120 BPM with maximum exercise.       Assessment and plan:   SVT / palpitations - Recent episodes of HR up to 190   - 09/11/2018 Ablation for SVT/AVNRT    - repeat monitor to identify rhythm. If there is nothing on monitor we can continue to monitor symptome. We Can consider ablation if recurrent or arrhythmia is identified on monitor.    - Lopressor 25 mg  BID PRN for rapid heart rate     - S/p 11/30/2022: Ablation of AV demetra re-entrant tachycardia. Ablation of isthmus dependent typical atrial flutter   - Denies recurrent episodes of tachycardia   - Follow up as needed     Bradycardia    - remains asymptomatic   - 92% of MPHR on GXT 2019   - Monitors HR on his apple watch, on eliptical his HR gets up to 110 BPM   - discussed symptoms of worsening bradycardia including decreased stamina, increased fatigue, if he ultimately needs treatment it would be to place a pacemaker   - Avoid AVN blocking agents   - GXT can be ordered to evaluate for chronotropic incompetence if he becomes symptomatic      At this point he seems to be asymptomatic. I explained the signs and symptoms of bradycardia. If he notices any change in his symptoms or lower heart rate then we can consider reevaluating perhaps with a stress test first.  Continue same management. Plan:   Follow up as needed  GXT can be ordered to evaluate chronotropic incompetence if he becomes symptomatic       Patient Active Problem List    Diagnosis Date Noted    Bradycardia 02/17/2022    Tachycardia 10/02/2019    Typical atrial flutter (HCC)     SVT (supraventricular tachycardia) (HCC)     Bilateral high frequency sensorineural hearing loss 06/04/2018    Subjective tinnitus of both ears 12/79/1292    Umbilical hernia without obstruction and without gangrene      Diagnostic studies:   ECG 2/8/23  Sinus Cliff Polo , QTcH 418,QRS 96  . Stress test 2/6/2019  Summary   Normal LV size and systolic function. Left ventricular ejection fraction of 55 %.    There is normal isotope uptake at stress and rest.   There is no evidence of myocardial ischemia or scar. MCOT 10/26/18-11/24/2018  Sinus rhythm with PAT and NSVT  HR  BPM with avg of 55 bpm   PVC And PAC 1%     Cardiac event monitor 7/5/2018:  CONCLUSION:  SINUS RHYTHM  SINUS BRADYCARDIA TO RATES AS LOW AS 38 BPM  TWO RUNS OF SVT TO RATES BETWEEN 180-200 BPM  SYMPTOMS WERE NOTED DURING ONE OF THE EPISODES THAT LASTED AROUND   1 MINUTE   THERE WERE SHORT RUNS OF SVT WITHOUT SYMPTOMS  PAC'S WERE NOTED AND SYMPTOMS WERE NOTED WITH THE PAC'S      Echo 8/8/2017:  - Left ventricle: The cavity size was mildly dilated. Wall thickness    was increased in a pattern of mild LVH. Systolic function was    normal. The estimated ejection fraction was in the range of 55% to    65%. Wall motion was normal; there were no regional wall motion    abnormalities. - Right ventricle: Systolic function was normal. TAPSE: 2.9cm.  - Right atrium: The atrium was mildly dilated. Echo 11/4/2010:  - Left ventricle: The cavity size was mildly to moderately dilated. Wall thickness was normal. Systolic function was normal. The     estimated ejection fraction was in the range of 55% to 60%. Wall     motion was normal; there were no regional wall motion     abnormalities. Left ventricular diastolic function parameters     were otherwise normal.   - Mitral valve: Mild regurgitation.   - Left atrium: The atrium was mildly dilated. - Right ventricle: The cavity size was mildly dilated. - Pulmonic valve: Peak gradient: 4mm Hg (S). I independently reviewed the cardiac diagnostic studies, ECG and relevant imaging studies. Lab Results   Component Value Date    LVEF 55 02/06/2019     No results found for: TSHFT4, TSH      Physical Examination:  Vitals:    02/08/23 1421   BP: 136/82   Pulse: 57   SpO2: 97%        Wt Readings from Last 3 Encounters:   02/08/23 226 lb (102.5 kg)   11/30/22 218 lb (98.9 kg)   02/17/22 217 lb (98.4 kg)       Constitutional: Oriented. No distress.    Head: Normocephalic and atraumatic. Mouth/Throat: Oropharynx is clear and moist.   Eyes: Conjunctivae normal. EOM are normal.   Neck: Neck supple. No rigidity. No JVD present. Cardiovascular: Normal rate, regular rhythm, S1&S2. Pulmonary/Chest: Bilateral respiratory sounds. No wheezes, No rhonchi. Abdominal: Soft. Bowel sounds present. No distension, No tenderness. Musculoskeletal: No tenderness. No edema    Lymphadenopathy: Has no cervical adenopathy. Neurological: Alert and oriented. Cranial nerve appears intact, No Gross deficit   Skin: Skin is warm and dry. No rash noted. Psychiatric: Has a normal behavior       Review of System:  [x] Full ROS obtained and negative except as mentioned in HPI    Prior to Admission medications    Medication Sig Start Date End Date Taking? Authorizing Provider   ASPIRIN 81 PO Take by mouth   Yes Historical Provider, MD   metoprolol tartrate (LOPRESSOR) 25 MG tablet Take 1 tablet by mouth as needed (for rapid heart rate / SVT) 2/17/22  Yes Marva Altamirano MD   aspirin 325 MG EC tablet Take 1 tablet by mouth every 6 hours as needed for Pain 11/21/19  Yes NANCIE Hudson - CNP   cetirizine (ZYRTEC) 10 MG tablet Take 10 mg by mouth as needed for Allergies   Yes Historical Provider, MD   terazosin (HYTRIN) 10 MG capsule Take 10 mg by mouth nightly   Yes Historical Provider, MD       Allergies   Allergen Reactions    Naproxen Hives    Aleve [Naproxen Sodium] Hives    Fluticasone      Other reaction(s): Other (see comments)  Thrush  Thrush    Ibuprofen     Neomycin-Bacitracin Zn-Polymyx      Other reaction(s): Other (See Comments)  Scars and wound does not heal   Other reaction(s): Other (see comments)  Scars and wound does not heal     Neosporin [Neomycin-Polymyxin-Gramicidin]     Nsaids        Social History:  Reviewed. reports that he quit smoking about 43 years ago. His smoking use included cigarettes. He started smoking about 54 years ago.  He has never used smokeless tobacco. He reports current alcohol use of about 2.0 standard drinks per week. He reports that he does not use drugs. Family History:  Reviewed. Reviewed. No family history of SCD. Relevant and available labs, and cardiovascular diagnostics reviewed. Reviewed. I independently reviewed relevant and available cardiac diagnostic tests ECG, CXR, Echo, Stress test, Device interrogation, Holter, CT scan. Outside medical records via Care everywhere reviewed and summarized in H&P above. Complex medical condition with multiple medical problems affecting prognosis and outcome of EP interventions       - The patient is counseled to follow a low salt diet to assure blood pressure remains controlled for cardiovascular risk factor modification.   - The patient is counseled to avoid excess caffeine, and energy drinks as this may exacerbated ectopy and arrhythmia. - The patient is counseled to get regular exercise 3-5 times per week to control cardiovascular risk factors. All questions and concerns were addressed to the patient/family. Alternatives to my treatment were discussed. I have discussed the above stated plan and the patient verbalized understanding and agreed with the plan. Scribe attestation: This note was scribed in the presence of Cindee Brittle, MD by Natalie Chávez RN    I, Dr. Cindee Brittle personally performed the services described in this documentation as scribed by RN in my presence, and it is both accurate and complete. NOTE: This report was transcribed using voice recognition software. Every effort was made to ensure accuracy, however, inadvertent computerized transcription errors may be present.      Cindee Brittle MD, Kristyn Gonsalez [facility]   Office: [phone]  Fax: [phone]

## 2023-02-08 ENCOUNTER — OFFICE VISIT (OUTPATIENT)
Dept: CARDIOLOGY CLINIC | Age: 73
End: 2023-02-08
Payer: MEDICARE

## 2023-02-08 VITALS
SYSTOLIC BLOOD PRESSURE: 136 MMHG | WEIGHT: 226 LBS | BODY MASS INDEX: 29 KG/M2 | HEIGHT: 74 IN | HEART RATE: 57 BPM | DIASTOLIC BLOOD PRESSURE: 82 MMHG | OXYGEN SATURATION: 97 %

## 2023-02-08 DIAGNOSIS — I48.3 TYPICAL ATRIAL FLUTTER (HCC): ICD-10-CM

## 2023-02-08 DIAGNOSIS — R00.1 BRADYCARDIA: ICD-10-CM

## 2023-02-08 DIAGNOSIS — I47.1 SVT (SUPRAVENTRICULAR TACHYCARDIA) (HCC): Primary | ICD-10-CM

## 2023-02-08 PROCEDURE — G8417 CALC BMI ABV UP PARAM F/U: HCPCS | Performed by: INTERNAL MEDICINE

## 2023-02-08 PROCEDURE — 1123F ACP DISCUSS/DSCN MKR DOCD: CPT | Performed by: INTERNAL MEDICINE

## 2023-02-08 PROCEDURE — 99214 OFFICE O/P EST MOD 30 MIN: CPT | Performed by: INTERNAL MEDICINE

## 2023-02-08 PROCEDURE — 3017F COLORECTAL CA SCREEN DOC REV: CPT | Performed by: INTERNAL MEDICINE

## 2023-02-08 PROCEDURE — 93000 ELECTROCARDIOGRAM COMPLETE: CPT | Performed by: INTERNAL MEDICINE

## 2023-02-08 PROCEDURE — 1036F TOBACCO NON-USER: CPT | Performed by: INTERNAL MEDICINE

## 2023-02-08 PROCEDURE — G8427 DOCREV CUR MEDS BY ELIG CLIN: HCPCS | Performed by: INTERNAL MEDICINE

## 2023-02-08 PROCEDURE — G8484 FLU IMMUNIZE NO ADMIN: HCPCS | Performed by: INTERNAL MEDICINE

## 2023-02-08 RX ORDER — EZETIMIBE 10 MG/1
TABLET ORAL
COMMUNITY
Start: 2023-01-30 | End: 2023-02-08 | Stop reason: ALTCHOICE

## 2023-08-21 ENCOUNTER — HOSPITAL ENCOUNTER (OUTPATIENT)
Dept: PHYSICAL THERAPY | Age: 73
Setting detail: THERAPIES SERIES
Discharge: HOME | End: 2023-08-21
Payer: MEDICARE

## 2023-08-21 PROCEDURE — 97530 THERAPEUTIC ACTIVITIES: CPT

## 2023-08-21 PROCEDURE — 97140 MANUAL THERAPY 1/> REGIONS: CPT

## 2023-08-21 PROCEDURE — 97162 PT EVAL MOD COMPLEX 30 MIN: CPT

## 2023-08-21 PROCEDURE — 97110 THERAPEUTIC EXERCISES: CPT

## 2023-08-21 NOTE — FLOWSHEET NOTE
progressing as expected towards functional goals listed. [] Progression is slowed due to complexities/Impairments listed. [] Progression has been slowed due to co-morbidities. [x] Plan just implemented, too soon to assess goals progression <30days   [] Goals require adjustment due to lack of progress  [] Patient is not progressing as expected and requires additional follow up with physician  [] Other    Persisting Functional Limitations/Impairments:  []Sitting []Standing   []Walking [x]Squatting/bending    [x]Stairs []ADL's    [x]Transfers []Reaching  [x]Housework []Job related tasks  []Driving [x]Sports/Recreation   []Sleeping []Other:    ASSESSMENT: Pt is a 68 y.o. male who presents with LBP and some residual LLE sciatica, vague N&T, decreased reflexes and mild weakness s/p HNP at L4-5. He has decreased spinal mobility in lumbosacral region, decreased LE flexibility and some core weakness. Pt should benefit from skilled PT to regain full strength in LLE so he can resume more athletic/active pursuits. Treatment/Activity Tolerance:  [x] Patient able to complete tx  [] Patient limited by fatique  [] Patient limited by pain  [] Patient limited by other medical complications  [] Other:     Prognosis: [x] Good [] Fair  [] Poor    Patient Requires Follow-up: [x] Yes  [] No    Plan for next session:  see flow sheet above    PLAN: See eval. PT 2x / week for 6 weeks. [] Continue per plan of care [] Alter current plan (see comments)  [x] Plan of care initiated [] Hold pending MD visit [] Discharge    Electronically signed by: Elisabet Landis, PT, DPT      Note: If patient does not return for scheduled/ recommended follow up visits, this note will serve as a discharge from care along with most recent update on progress.

## 2023-09-07 ENCOUNTER — HOSPITAL ENCOUNTER (OUTPATIENT)
Dept: PHYSICAL THERAPY | Age: 73
Setting detail: THERAPIES SERIES
Discharge: HOME | End: 2023-09-07

## 2023-09-07 NOTE — PROGRESS NOTES
Physical Therapy    [address]     Physical Therapy  Cancellation/No-show Note  Patient Name:  Nella Sterling  :  1950   Date:  2023  Cancelled visits to date: 0  No-shows to date: 0    Patient status for today's appointment patient:  [x]  Cancelled  []  Rescheduled appointment  []  No-show     Reason given by patient:  [x]  Patient ill  []  Conflicting appointment  []  No transportation    []  Conflict with work  []  No reason given  []  Other:     Comments:      Phone call information:   []  Phone call made today to patient at _ time at number provided:      []  Patient answered, conversation as follows:    []  Patient did not answer, message left as follows:  []  Phone call not made today  [x]  Phone call not needed - pt contacted us to cancel and provided reason for cancellation.      Electronically signed by:  Brennan Queen PTA

## 2023-09-12 ENCOUNTER — HOSPITAL ENCOUNTER (OUTPATIENT)
Dept: PHYSICAL THERAPY | Age: 73
Setting detail: THERAPIES SERIES
Discharge: HOME | End: 2023-09-12
Payer: MEDICARE

## 2023-09-12 PROCEDURE — 97112 NEUROMUSCULAR REEDUCATION: CPT

## 2023-09-12 PROCEDURE — 97110 THERAPEUTIC EXERCISES: CPT

## 2023-09-12 NOTE — FLOWSHEET NOTE
[address]  Phone: [phone]   Fax: [phone]    Physical Therapy Daily Treatment Note    Date:  2023     [facility]  1950  MRN: [id number]    Evaluation Date: 2023                                         Medical Diagnosis Information:  Lumbar HNP        PT diagnosis: mild LBP with sciatica, residual N&T and decreased reflexes and mild weakness LLE, decreased LE flexibility, and stiffness of lumbosacral region, decreased core strength                                    Insurance information:  Medicare     Plan of care signed (Y/N): []  Yes  [x]  No-faxed     Date of Patient follow up with Physician:      Progress Report: []  Yes  [x]  No     Date Range for reporting period:  Beginnin2023  PN;  Ending:     Progress report due (10 Rx/or 30 days whichever is less):     Recertification due (POC duration/ or 90 days whichever is less):  6 wks  10/2/23    Visit # [place] Allowable Auth required? Date Range    [place] []  Yes  [x]  No pcy       Latex Allergy:  [x]NO      []YES  Preferred Language for Healthcare:   [x]English       []other:    Functional Scale:       Date assessed:  NASIR: raw score = 4/50; dysfunction = 8%  23    Pain level:  1-3/10 LBP/LLE pain     SUBJECTIVE:  Not having mch back pain, occasional pain in L side low back/hip. Reports significant decrease in paresthesia with only barely noticeable symptom in L lateral hip. L leg still doesn't feel exactly like R side and is still weak comparably. Was able to go for 30 minute walks around [place] and felt good until he became sick a week or so ago.      OBJECTIVE: See eval  Observation:   Test measurements:      RESTRICTIONS/PRECAUTIONS: none      Treatment based classification:    [x] mobilization/manipulation   [] stabilization   [] extension based   [] flexion based   [] lateral shift   [] traction   [] unspecified

## 2023-09-14 ENCOUNTER — HOSPITAL ENCOUNTER (OUTPATIENT)
Dept: PHYSICAL THERAPY | Age: 73
Setting detail: THERAPIES SERIES
Discharge: HOME | End: 2023-09-14
Payer: MEDICARE

## 2023-09-14 PROCEDURE — 97110 THERAPEUTIC EXERCISES: CPT

## 2023-09-14 PROCEDURE — 97112 NEUROMUSCULAR REEDUCATION: CPT

## 2023-09-14 NOTE — FLOWSHEET NOTE
Therapeutic Exercise and NMR EXR  [x] (68282) Provided verbal/tactile cueing for activities related to strengthening, flexibility, endurance, ROM  for improvements in proximal hip and core control with self care, mobility, lifting and ambulation.  [] (47417) Provided verbal/tactile cueing for activities related to improving balance, coordination, kinesthetic sense, posture, motor skill, proprioception  to assist with core control in self care, mobility, lifting, and ambulation.   [] (39995) Therapist is in constant attendance of 2 or more patients providing skilled therapy interventions, but not providing any significant amount of measurable one-on-one time to either patient, for improvements in LE, proximal hip, and core control in self care, mobility, lifting, ambulation and eccentric single leg control.      Therapeutic Activities:    [x] (76120 or 88193) Provided verbal/tactile cueing for activities related to improving balance, coordination, kinesthetic sense, posture, motor skill, proprioception and motor activation to allow for proper function  with self care and ADLs  [] (24396) Provided training and instruction to the patient for proper core and proximal hip recruitment and positioning with ambulation re-education     Home Exercise Program:    [x] (37842) Reviewed/Progressed HEP activities related to strengthening, flexibility, endurance, ROM of core, proximal hip and LE for functional self-care, mobility, lifting and ambulation   [] (75195) Reviewed/Progressed HEP activities related to improving balance, coordination, kinesthetic sense, posture, motor skill, proprioception of core, proximal hip and LE for self care, mobility, lifting, and ambulation      Manual Treatments:  PROM / STM / Oscillations-Mobs:  G-I, II, III, IV (PA's, Inf., Post.)  [x] (96597) Provided manual therapy to mobilize proximal hip and LS spine soft tissue/joints for the purpose of modulating pain, promoting relaxation,  increasing

## 2023-09-19 ENCOUNTER — HOSPITAL ENCOUNTER (OUTPATIENT)
Dept: PHYSICAL THERAPY | Age: 73
Setting detail: THERAPIES SERIES
Discharge: HOME | End: 2023-09-19
Payer: MEDICARE

## 2023-09-19 PROCEDURE — 97112 NEUROMUSCULAR REEDUCATION: CPT

## 2023-09-19 PROCEDURE — 97110 THERAPEUTIC EXERCISES: CPT

## 2023-09-19 NOTE — FLOWSHEET NOTE
[address]  Phone: [phone]   Fax: [phone]    Physical Therapy Daily Treatment Note    Date:  2023     Patient Name:  Abelino Chow   [facility]" :  1950  MRN: [id number]    Evaluation Date: 2023                                         Medical Diagnosis Information:  Lumbar HNP        PT diagnosis: mild LBP with sciatica, residual N&T and decreased reflexes and mild weakness LLE, decreased LE flexibility, and stiffness of lumbosacral region, decreased core strength                                    Insurance information:  Medicare     Plan of care signed (Y/N): []  Yes  [x]  No-faxed     Date of Patient follow up with Physician:      Progress Report: []  Yes  [x]  No     Date Range for reporting period:  Beginnin2023  PN;  Ending:     Progress report due (10 Rx/or 30 days whichever is less): 8/15/73    Recertification due (POC duration/ or 90 days whichever is less):  6 wks  10/2/23    Visit # Insurance Allowable Auth required? Date Range   3/ 12 [place] []  Yes  [x]  No pcy       Latex Allergy:  [x]NO      []YES  Preferred Language for Healthcare:   [x]English       []other:    Functional Scale:       Date assessed:  NASIR: raw score = 4/50; dysfunction = 8%  23    Pain level:  1-3/10 LBP/LLE pain     SUBJECTIVE:  Denies increased hip/ LE symptoms after last visit like he did after the first visit. Was a little stiff. Played golf yesterday and feels like he is walking better and played better golf game yesterday.      OBJECTIVE: See eval  Observation:   Test measurements:      RESTRICTIONS/PRECAUTIONS: none      Treatment based classification:    [x] mobilization/manipulation   [] stabilization   [] extension based   [] flexion based   [] lateral shift   [] traction   [] unspecified Components:   [] thoracolumbar   [] pelvic   [] SIJ   [] sacral   [] hip         Comparable sign: LLE strength, improved L-quad

## 2023-09-21 ENCOUNTER — HOSPITAL ENCOUNTER (OUTPATIENT)
Dept: PHYSICAL THERAPY | Age: 73
Setting detail: THERAPIES SERIES
Discharge: HOME | End: 2023-09-21
Payer: MEDICARE

## 2023-09-21 PROCEDURE — 97530 THERAPEUTIC ACTIVITIES: CPT

## 2023-09-21 PROCEDURE — 97110 THERAPEUTIC EXERCISES: CPT

## 2023-09-21 NOTE — FLOWSHEET NOTE
increased symptoms. Will plan to complete remaining two sessions then put on hold pending self management trial.     Treatment/Activity Tolerance:  [x] Patient able to complete tx  [] Patient limited by fatique  [] Patient limited by pain   [] Patient limited by other medical complications  [] Other:     Prognosis: [x] Good [] Fair  [] Poor    Patient Requires Follow-up: [x] Yes  [] No    Plan for next session:  see flow sheet above    PLAN: See eval. PT 2x / week for 6 weeks. [] Continue per plan of care [] Alter current plan (see comments)  [x] Plan of care initiated [] Hold pending MD visit [] Discharge    Electronically signed by: Lorrie Gatica PT, DPT      Note: If patient does not return for scheduled/ recommended follow up visits, this note will serve as a discharge from care along with most recent update on progress.

## 2023-09-26 ENCOUNTER — HOSPITAL ENCOUNTER (OUTPATIENT)
Dept: PHYSICAL THERAPY | Age: 73
Setting detail: THERAPIES SERIES
Discharge: HOME | End: 2023-09-26
Payer: MEDICARE

## 2023-09-26 PROCEDURE — 97112 NEUROMUSCULAR REEDUCATION: CPT

## 2023-09-26 PROCEDURE — 97530 THERAPEUTIC ACTIVITIES: CPT

## 2023-09-26 PROCEDURE — 97110 THERAPEUTIC EXERCISES: CPT

## 2023-09-26 NOTE — FLOWSHEET NOTE
[address]  Phone: [phone]   Fax: [phone]    Physical Therapy Daily Treatment Note    Date:  2023     Patient Name:  Luis Alberto Meek   [facility]" :  1950  MRN: [id number]    Evaluation Date: 2023                                         Medical Diagnosis Information:  Lumbar HNP        PT diagnosis: mild LBP with sciatica, residual N&T and decreased reflexes and mild weakness LLE, decreased LE flexibility, and stiffness of lumbosacral region, decreased core strength                                    Insurance information:  Medicare     Plan of care signed (Y/N): []  Yes  [x]  No-faxed     Date of Patient follow up with Physician:      Progress Report: []  Yes  [x]  No     Date Range for reporting period:  Beginnin2023  PN: 23  Ending:     Progress report due (10 Rx/or 30 days whichever is less): 82/95/49    Recertification due (POC duration/ or 90 days whichever is less):  6 wks  10/2/23    Visit # Insurance Allowable Auth required? Date Range    [place] []  Yes  [x]  No pcy       Latex Allergy:  [x]NO      []YES  Preferred Language for Healthcare:   [x]English       []other:    Functional Scale:       Date assessed:  NASIR: raw score = 4/50; dysfunction = 8%  23    Pain level:  1-3/10 LBP/LLE pain     SUBJECTIVE:  Has been golfing and doing well. States back is more stiff but no pain in back or leg. Does double knee chest stretch in morning to loosen up and \"one leg still doesn't feel like the other one, it's not perfect\" but overall is much better. Feels about 80% improved from initial visit. Still feels some LE weakness in L side.      OBJECTIVE: See eval  Observation:   Test measurements:    :  Strength LEFT RIGHT Comments   Multifidus         Transverse Ab 4- 4- Has umbilical hernia   Hip Flexors 4- 5     Hip Abductors 4 5     Hip Extensors 4 4     Hip Internal Rotators 5 4+     Hip External Rotators 5 4+       ROM

## 2023-09-28 ENCOUNTER — HOSPITAL ENCOUNTER (OUTPATIENT)
Dept: PHYSICAL THERAPY | Age: 73
Setting detail: THERAPIES SERIES
Discharge: HOME | End: 2023-09-28
Payer: MEDICARE

## 2023-09-28 PROCEDURE — 97530 THERAPEUTIC ACTIVITIES: CPT

## 2023-09-28 NOTE — FLOWSHEET NOTE
in: 4-6 weeks  1. Pt will improve NASIR to < 5% dysfunction to  progress towards PLOF. [x] Progressing: [] Met: [] Not Met: [] Adjusted  2. Patient will demonstrate increased AROM of lumbar spine to WNL without pain and improve LE flexibility especially HS 90/90 to <15 deg deficit to help decrease stress on LB when bending  [x] Progressing: [] Met: [] Not Met: [] Adjusted  3. Patient will demonstrate an increase in postural awareness and control and activation of deep cervical stabilizers to allow for proper functional mobility as indicated by patients Functional Deficits. [x] Progressing: [] Met: [] Not Met: [] Adjusted  4. Patient will demonstrate an increase in Strength of LLE to grossly 4+/5 and core muscles to at least 4/5 so pt can lift heavier objects without LBP  [x] Progressing: [] Met: [] Not Met: [] Adjusted  5. Patient will return to functional activities including walking uphill/downhill on golf course without needing to use club to support him  [x] Progressing: [] Met: [] Not Met: [] Adjusted  6. Pt will be able to resume golf and pickleball without fear of LLE giving out  [x] Progressing: [] Met: [] Not Met: [] Adjusted            Overall Progression Towards Functional goals/ Treatment Progress Update:  [x] Patient is progressing as expected towards functional goals listed. [] Progression is slowed due to complexities/Impairments listed. [] Progression has been slowed due to co-morbidities.   [] Plan just implemented, too soon to assess goals progression <30days   [] Goals require adjustment due to lack of progress  [] Patient is not progressing as expected and requires additional follow up with physician  [] Other    Persisting Functional Limitations/Impairments:  []Sitting []Standing   []Walking [x]Squatting/bending    [x]Stairs []ADL's    [x]Transfers []Reaching  [x]Housework []Job related tasks  []Driving [x]Sports/Recreation   []Sleeping []Other:    ASSESSMENT:  Pt has progressed well with

## 2023-10-24 ENCOUNTER — TELEPHONE (OUTPATIENT)
Dept: CARDIOLOGY CLINIC | Age: 73
End: 2023-10-24

## 2023-10-24 ENCOUNTER — OFFICE VISIT (OUTPATIENT)
Dept: CARDIOLOGY CLINIC | Age: 73
End: 2023-10-24
Payer: MEDICARE

## 2023-10-24 VITALS
SYSTOLIC BLOOD PRESSURE: 134 MMHG | HEIGHT: 73 IN | WEIGHT: 227.2 LBS | DIASTOLIC BLOOD PRESSURE: 78 MMHG | BODY MASS INDEX: 30.11 KG/M2 | HEART RATE: 96 BPM

## 2023-10-24 DIAGNOSIS — R00.1 BRADYCARDIA: ICD-10-CM

## 2023-10-24 DIAGNOSIS — I47.10 SVT (SUPRAVENTRICULAR TACHYCARDIA): Primary | ICD-10-CM

## 2023-10-24 DIAGNOSIS — I48.3 TYPICAL ATRIAL FLUTTER (HCC): ICD-10-CM

## 2023-10-24 DIAGNOSIS — E66.3 OVERWEIGHT: ICD-10-CM

## 2023-10-24 PROBLEM — R00.0 TACHYCARDIA: Status: RESOLVED | Noted: 2019-10-02 | Resolved: 2023-10-24

## 2023-10-24 PROCEDURE — G8427 DOCREV CUR MEDS BY ELIG CLIN: HCPCS | Performed by: NURSE PRACTITIONER

## 2023-10-24 PROCEDURE — 3017F COLORECTAL CA SCREEN DOC REV: CPT | Performed by: NURSE PRACTITIONER

## 2023-10-24 PROCEDURE — 93000 ELECTROCARDIOGRAM COMPLETE: CPT | Performed by: NURSE PRACTITIONER

## 2023-10-24 PROCEDURE — 1036F TOBACCO NON-USER: CPT | Performed by: NURSE PRACTITIONER

## 2023-10-24 PROCEDURE — 99214 OFFICE O/P EST MOD 30 MIN: CPT | Performed by: NURSE PRACTITIONER

## 2023-10-24 PROCEDURE — G8484 FLU IMMUNIZE NO ADMIN: HCPCS | Performed by: NURSE PRACTITIONER

## 2023-10-24 PROCEDURE — 1123F ACP DISCUSS/DSCN MKR DOCD: CPT | Performed by: NURSE PRACTITIONER

## 2023-10-24 PROCEDURE — G8417 CALC BMI ABV UP PARAM F/U: HCPCS | Performed by: NURSE PRACTITIONER

## 2023-10-24 NOTE — TELEPHONE ENCOUNTER
Last OV: 10/24/23  Last Labs: 11/30/22  Last EKG: 10/24/23  Appt scheduled : None           dilTIAZem (CARDIZEM) 30 MG tablet 120 tablet 3 10/24/2023     Sig - Route: Take 1 tablet by mouth 2 times daily as needed (Take one at onset of atrial tachycardia/flutter.   If you do not convert, take another dose 2-3 hours later) - Oral    Sent to pharmacy as: dilTIAZem HCl 30 MG Oral Tablet (Cardizem)    E-Prescribing Status: Receipt confirmed by pharmacy (10/24/2023  1:40 PM EDT)        NPSR hs sent meds over

## 2023-10-24 NOTE — PATIENT INSTRUCTIONS
1. Take a dose of cardizem 30 mg tonight. If your heart rate remains elevated in the morning, take another 30 mg dose.  If this does not work, let us know tomorrow

## 2023-10-24 NOTE — TELEPHONE ENCOUNTER
Last OV: 10/24/23  Last labs: 22  Last EKG: 10/24/23  Appt scheduled :  None      Pt needs needs refill sent in as his has . metoprolol tartrate (LOPRESSOR) 25 MG tablet 30 tablet 1 2022     Sig - Route:  Take 1 tablet by mouth as needed (for rapid heart rate / SVT) - Oral    Sent to pharmacy as: Metoprolol Tartrate 25 MG Oral Tablet (LOPRESSOR)    Cosign for Ordering: Accepted by Kendra Mcgee MD on 2022  3:59 PM    E-Prescribing Status: Receipt confirmed by pharmacy (2022  2:50 PM EST)

## 2023-10-24 NOTE — PROGRESS NOTES
[facility]   Electrophysiology  Havery Points, APRN-CNP  Attending EP: Dr. Darryle Pines    Date: 10/24/2023  I had the privilege of visiting Mena Neil in the office. Chief Complaint:   Chief Complaint   Patient presents with    Other     Racing Hr     History of Present Illness: History obtained from patient and medical record. Mena Neil is 68 y.o. male with a past medical history of SVT, atrial flutter. In April of 2018, he presented to the ER for palpitations, CP, SOB, and feelings of dizziness. He was found to be in SVT. He has had 5 episodes of SVT since 2017 and uses vagal manuevers to cease episodes. Hx of bradycardia at rest: 40/50s. 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 S/p ablation of AV demetra re-entrant tachycardia, isthmus dependent typical atrial flutter (11/30/22)    -Interval history: Today, Mena Neil is being seen for urgent follow up. He states he noticed a tachycardic pulse rate and called to be seen. He is in atrial tach/flutter on EKG. He states the episode started last night around 830 PM. He states he had golfed and ate dinner, then was doing some leg work outs when the episode started. He is generally very active. He follows a Keto diet, but states he is \"terrible\" at following it. His only symptom is mild heart fluttering and a \"feeling\" in his chest.    Denies having chest pain, palpitations, shortness of breath, orthopnea/PND, cough, or dizziness at the time of this visit. With regard to medication therapy the patient has been compliant with prescribed regimen. They have tolerated therapy to date. Allergies: Allergies   Allergen Reactions    Naproxen Hives    Aleve [Naproxen Sodium] Hives    Fluticasone      Other reaction(s): Other (see comments)  Thrush  Thrush    Ibuprofen     Neomycin-Bacitracin Zn-Polymyx      Other reaction(s): Other (See Comments)  Scars and wound does not heal   Other reaction(s):  Other

## 2023-10-24 NOTE — TELEPHONE ENCOUNTER
Called and spoke to PT he is not having any current CP, Palpitations, dizziness, edema or SOB. His B/P is 142/107 and his HR is 116. Please advise.

## 2023-10-24 NOTE — TELEPHONE ENCOUNTER
Medication Refill    Medication needing refilled:  metoprolol tartrate (LOPRESSOR)    Dosage of the medication:  25 MG tablet     How are you taking this medication (QD, BID, TID, QID, PRN):  Take 1 tablet by mouth as needed (for rapid heart rate / SVT)    30 or 90 day supply called in:  30 tablet    When will you run out of your medication:  Now    Which Pharmacy are we sending the medication to?:    [facility] [address] [address] [address] [phone] F. [phone]

## 2023-10-24 NOTE — TELEPHONE ENCOUNTER
Pt called to inform MXA that his hr is elevating 105. Pt is wanting to know how many metoprolol can he take to lower his hr. Please call to discuss.   Thank you

## 2023-10-25 ENCOUNTER — TELEPHONE (OUTPATIENT)
Dept: CARDIOLOGY CLINIC | Age: 73
End: 2023-10-25

## 2023-10-25 ENCOUNTER — NURSE ONLY (OUTPATIENT)
Dept: CARDIOLOGY CLINIC | Age: 73
End: 2023-10-25

## 2023-10-25 DIAGNOSIS — I49.9 IRREGULAR HEART BEAT: Primary | ICD-10-CM

## 2023-10-25 NOTE — PROGRESS NOTES
Pt remains in atrial flutter. He has more symptoms      - Treatment options including cardioversion, rate control strategy, antiarrhythmics, anticoagulation and possible ablation were discussed with patient. Risks, benefits and alternative of each treatment options were explained. All questions answered    -----> Discussed with Dr. Cristopher Ling. He has been in atrial flutter for ~ 48 hours. He did not convert with PO cardizem. Will start eliquis 5 mg BID and will arrange for JESUS MANUEL/DCCV this week with Dr. Joey Clark. Pt VU and agrees to proceed.  I instructed him to only take eliquis the morning of procedure and to hold cardizem due to his hx of marked sinus bradycardia    Isra Rodney, APRN-CNP

## 2023-10-25 NOTE — TELEPHONE ENCOUNTER
Pt called and stated his HR is still running high. He took diltiazem last night at 7pm and another pill today at 6am. Pt stated it didn't work, he feel same as before. Pt asking if he should take another diltiazem? Pt also asking what should he do, does he need procedure or should he come in to see provider?   Please advise

## 2023-10-26 ENCOUNTER — TELEPHONE (OUTPATIENT)
Dept: CARDIOLOGY CLINIC | Age: 73
End: 2023-10-26

## 2023-10-26 NOTE — TELEPHONE ENCOUNTER
Spoke with the pt and got him scheduled for procedure on Monday. He will be leaving town on [place]. We went over instructions below and he verbalized understanding. Procedure - JESUS MANUEL and cardioversion. Date: 10/30/2023  Arrival time: 12:00 pm  Procedure time: 1:00 pm (approved by cath lab staff)     Our  will call you to discuss a date for your procedure. The day of your procedure you will need to check in at the Registration Desk in the [address] [place] [address]. PRE-PROCEDURE INSTRUCTIONS              Do not eat or drink anything after midnight the night before your procedure. Take all your medications with a sip of water in the morning. Do not hold your Eliquis the morning of the procedure              Please have a responsible adult to drive you home after your procedure. If you have any questions regarding your procedure itself or your medications, please call [phone] and ask to speak to an EP nurse.       Qgenda updated / Added in Shakira Bolden / emailed cath lab

## 2023-10-26 NOTE — TELEPHONE ENCOUNTER
[facility] for patient to return call as the Dr wanted him scheduled for tomorrow. Please add this Friday. [facility] with Dr. Sahra Malcolm are scheduled for a JESUS MANUEL and cardioversion. Our  will call you to discuss a date for your procedure. The day of your procedure you will need to check in at the Registration Desk in the [facility]. PRE-PROCEDURE INSTRUCTIONS              Do not eat or drink anything after midnight the night before your procedure. Take all your medications with a sip of water in the morning. Do not hold your Eliquis the morning of the procedure              Please have a responsible adult to drive you home after your procedure. If you have any questions regarding your procedure itself or your medications, please call [phone] and ask to speak to an EP nurse.

## 2023-10-30 ENCOUNTER — HOSPITAL ENCOUNTER (OUTPATIENT)
Dept: CARDIAC CATH/INVASIVE PROCEDURES | Age: 73
Discharge: HOME | End: 2023-10-30
Attending: INTERNAL MEDICINE | Admitting: INTERNAL MEDICINE
Payer: MEDICARE

## 2023-10-30 VITALS
HEIGHT: 73 IN | WEIGHT: 227 LBS | DIASTOLIC BLOOD PRESSURE: 82 MMHG | RESPIRATION RATE: 17 BRPM | SYSTOLIC BLOOD PRESSURE: 126 MMHG | OXYGEN SATURATION: 100 % | HEART RATE: 65 BPM | BODY MASS INDEX: 30.09 KG/M2

## 2023-10-30 LAB
DEPRECATED RDW RBC AUTO: 14 % (ref 12.4–15.4)
EKG ATRIAL RATE: 250 BPM
EKG ATRIAL RATE: 68 BPM
EKG DIAGNOSIS: NORMAL
EKG DIAGNOSIS: NORMAL
EKG P AXIS: 32 DEGREES
EKG P AXIS: 46 DEGREES
EKG P-R INTERVAL: 162 MS
EKG Q-T INTERVAL: 250 MS
EKG Q-T INTERVAL: 392 MS
EKG QRS DURATION: 88 MS
EKG QRS DURATION: 94 MS
EKG QTC CALCULATION (BAZETT): 360 MS
EKG QTC CALCULATION (BAZETT): 416 MS
EKG R AXIS: 4 DEGREES
EKG R AXIS: 4 DEGREES
EKG T AXIS: 125 DEGREES
EKG T AXIS: 30 DEGREES
EKG VENTRICULAR RATE: 125 BPM
EKG VENTRICULAR RATE: 68 BPM
HCT VFR BLD AUTO: 50.2 % (ref 40.5–52.5)
HGB BLD-MCNC: 16.8 G/DL (ref 13.5–17.5)
MCH RBC QN AUTO: 29.5 PG (ref 26–34)
MCHC RBC AUTO-ENTMCNC: 33.4 G/DL (ref 31–36)
MCV RBC AUTO: 88.4 FL (ref 80–100)
PLATELET # BLD AUTO: 142 K/UL (ref 135–450)
PMV BLD AUTO: 9 FL (ref 5–10.5)
RBC # BLD AUTO: 5.68 M/UL (ref 4.2–5.9)
WBC # BLD AUTO: 7.6 K/UL (ref 4–11)

## 2023-10-30 PROCEDURE — 7100000010 HC PHASE II RECOVERY - FIRST 15 MIN

## 2023-10-30 PROCEDURE — 93320 DOPPLER ECHO COMPLETE: CPT

## 2023-10-30 PROCEDURE — 92960 CARDIOVERSION ELECTRIC EXT: CPT | Performed by: INTERNAL MEDICINE

## 2023-10-30 PROCEDURE — 99152 MOD SED SAME PHYS/QHP 5/>YRS: CPT | Performed by: INTERNAL MEDICINE

## 2023-10-30 PROCEDURE — 93010 ELECTROCARDIOGRAM REPORT: CPT | Performed by: INTERNAL MEDICINE

## 2023-10-30 PROCEDURE — 92960 CARDIOVERSION ELECTRIC EXT: CPT

## 2023-10-30 PROCEDURE — 93325 DOPPLER ECHO COLOR FLOW MAPG: CPT

## 2023-10-30 PROCEDURE — 93312 ECHO TRANSESOPHAGEAL: CPT

## 2023-10-30 PROCEDURE — 2500000003 HC RX 250 WO HCPCS

## 2023-10-30 PROCEDURE — 93005 ELECTROCARDIOGRAM TRACING: CPT | Performed by: INTERNAL MEDICINE

## 2023-10-30 PROCEDURE — 36415 COLL VENOUS BLD VENIPUNCTURE: CPT

## 2023-10-30 PROCEDURE — 85027 COMPLETE CBC AUTOMATED: CPT

## 2023-10-30 RX ORDER — SODIUM CHLORIDE 0.9 % (FLUSH) 0.9 %
5-40 SYRINGE (ML) INJECTION PRN
Status: DISCONTINUED | OUTPATIENT
Start: 2023-10-30 | End: 2023-10-30 | Stop reason: HOSPADM

## 2023-10-30 NOTE — PROCEDURES
[facility]     Electrophysiology Procedure Note       Date of Procedure: 10/30/2023  Patient's Name: Virginia Chavis  YOB: 1950   Medical Record Number: [id number]  Procedure Performed by: Karin Mills MD    Procedures performed:  IV sedation. Trans-esophageal echocardiography  External Electrical cardioversion   Mallampati3  ASA 3    Indication of the procedure: Persistent  atrial flutter      Details of procedure: The patient was brought to the cath lab area in a fasting and non-sedated state. The risks, benefits and alternatives of the procedure were discussed with the patient. The patient opted to proceed with the procedure. Written informed consent was signed and placed in the chart. A timeout protocol was completed to identify the patient and the procedure being performed. IV sedation was provided with IV Versed, Fentanyl initially and JESUS MANUEL was performed which did not show any IDANIA/LA clot/thrombus. Full JEUSS MANUEL reports will be dictated. I pushed 50 mg Brevital.An independent trained observer pushed medications  at my direction. We monitored the patient's level of consciousness and vital signs/physiologic status throughout the procedure duration (see start and stop times below). Sedation:  2 mg Versed, 50 mcg Fentanyl   Sedation start: 1216  Sedation stop: 1240     Patient is on chronic anticoagulation therapy. Then we used Brevital for sedation and electrical DC cardioversion was perfomred using 200J, synchronized shock. Patient was converted to sinus rhythm at 59 bpm. The patient tolerated the procedure well and there were no complications. Conclusion:   Successful external DC cardioversion of atrial  flutter . Plan:   The patient can be discharged if remains stable. Will continue with medical therapy.    Ablation of atrial flutteri f recurrent

## 2023-10-30 NOTE — H&P
MD Eldon   aspirin 325 MG EC tablet Take 1 tablet by mouth every 6 hours as needed for Pain Yes Maricruz Middleton APRN - CNP   cetirizine (ZYRTEC) 10 MG tablet Take 1 tablet by mouth as needed for Allergies Yes Provider, MD Robert   terazosin (HYTRIN) 10 MG capsule Take 1 capsule by mouth nightly Yes Provider, MD Robert      Past Medical History:  Past Medical History:   Diagnosis Date    BPH (benign prostatic hyperplasia)     Bradycardia     Cancer ([address])     skin ca removed    SVT (supraventricular tachycardia)      Past Surgical History:    has a past surgical history that includes knee surgery (Left, 11/22/2016) and Tonsillectomy. Social History:  Reviewed. reports that he quit smoking about 43 years ago. His smoking use included cigarettes. He started smoking about 54 years ago. He has never used smokeless tobacco. He reports current alcohol use of about 2.0 standard drinks of alcohol per week. He reports that he does not use drugs. Family History:  Reviewed. Denies family history of sudden cardiac death, arrhythmia, premature CAD    Review of System:  Constitutional: Negative for fever, night sweats, chills, weight changes, or weakness  Skin: Negative for rash, dry skin, pruritus, bruising, bleeding, blood clots, or changes in skin pigment  HEENT: Negative for vision changes, ringing in the ears, sore throat, dysphagia, or swollen lymph nodes  Respiratory: Reviewed in HPI  Cardiovascular: Reviewed in HPI  Gastrointestinal: Negative for abdominal pain, N/V/D, constipation, or black/tarry stools  Genito-Urinary: Negative for dysuria, incontinence, urgency, or hematuria  Musculoskeletal: Negative for joint swelling, muscle pain, or injuries  Neurological/Psych: Negative for confusion, seizures, dizziness, headaches, balance issues or TIA-like symptoms.  No anxiety, depression, or insomnia    Physical Examination:  Vitals:    10/24/23 1316   BP: 134/78   Pulse: 96        Wt Readings from Last 3 Encounters:   10/24/23 103.1 kg (227 lb 3.2 oz)   02/08/23 102.5 kg (226 lb)   11/30/22 98.9 kg (218 lb)     Constitutional: Cooperative and in no apparent distress, and appears well nourished  Skin: Warm and pink; no pallor, cyanosis, bruising, or clubbing  HEENT: Symmetric and normocephalic. PERRL, EOM intact. Conjunctiva pink with clear sclera. Mucus membranes pink and moist. Teeth intact. Thyroid smooth without nodules or goiter  Respiratory: Respirations symmetric and unlabored. Lungs clear to auscultation bilaterally, no wheezing, rhonchi, or crackles  Cardiovascular:  Tachycardic rate and regular rhythm. S1/S2 present without murmurs, rubs, or gallops. Peripheral pulses 2+, capillary refill < 3 seconds. No elevation of JVP. No peripheral edema  Gastrointestinal: Abdomen soft and round. Bowel sounds normoactive in all quadrants without tenderness or masses. Musculoskeletal: Bilateral upper and lower extremity strength 5/5 with full ROM. Neurological/Psych: Awake and orientated to person, place and time. Calm affect, appropriate mood. Pertinent labs, diagnostic, device, and imaging results reviewed as a part of this visit    LABS    CBC:   Lab Results   Component Value Date    WBC 6.7 11/30/2022    HGB 16.7 11/30/2022    HCT 50.0 11/30/2022    MCV 90.0 11/30/2022     (L) 11/30/2022     BMP:   Lab Results   Component Value Date    CREATININE 1.0 11/30/2022    BUN 17 11/30/2022     11/30/2022    K 3.8 11/30/2022     11/30/2022    CO2 30 11/30/2022     CrCl cannot be calculated (Patient's most recent lab result is older than the maximum 180 days allowed. ).      Thyroid: No results found for: \"TSH\", \"C8TOCTE\", \"T5ABJWL\", \"THYROIDAB\"     Lipid Panel: No results found for: \"CHOL\", \"HDL\", \"TRIG\"     LFTs:  Lab Results   Component Value Date    ALT 22 04/07/2018    AST 24 04/07/2018    ALKPHOS 57 04/07/2018    BILITOT 0.5 04/07/2018     Coags:   Lab Results   Component Value Date    PROTIME 12.2

## 2023-10-30 NOTE — PROGRESS NOTES
CATH LAB PROCEDURE LOG - TRANSESOPHAGEAL ECHOCARDIOGRAM, POSSIBLE CARDIOVERSION    PRE PROCEDURE    DATE: 10/30/2023 ARRIVAL TO CATH LAB: 12:06 PM    ID & ALLERGY BAND: On    CONSENT: Yes    NPO SINCE: Midnight    IV SITE : Started in left arm.  with fluids infusing at kvo 12:06 PM     Pt arrived to Cath Lab. Plan of Care: Hemodynamics and cardiac rhythm will remain stable. Comfort level will be maintained. Respiratory function will remain adequate. Pt/family will verbalize understanding of the procedure. Procedure will be tolerated without complications. Patient will recover from procedure without complications. ID armband on patient and identification verified. Informed consent obtained. Non invasive blood pressure cuff applied, monitoring initiated. EKG pads and pulse oximeter applied, monitoring initiated. Instructions given. Patient and / or family verbalize understanding. H&P will be documented by physician in [address] [place] [address]. Pt has been NPO since midnight. Pre CV Rhythm: Atrial Flutter    Defibrillator pads applied to chest.       TRANSESOPHAGEAL ECHOCARDIOGRAM    Timeout and fire safety completed. TIMEOUT TIME: 12:19 PM    CORRECT PATIENT VERIFIED. MEMBERS OF THE SURGICAL TEAM/VISITORS INTRODUCED. ALLERGIES ANNOUNCED. CORRECT PROCEDURE VERIFIED. CORRECT PROCEDURAL SITE VERIFIED. CONSENTS VERIFIED. IMPLANT EQUIPMENT, ADDITIONAL SERVICES, SPECIAL REQUIREMENTS AVAILABLE. MEDICATIONS LABELED AND AVAILABLE. APPROPRIATE PRE MEDS HAVE BEEN ADMINISTERED. FIRE SAFETY: ALCOHOL PREP SOLUTION HAD SUFFICIENT TIME TO DISSIPATE IF USED. FIRE SAFETY: SURGICAL SITE OR INCISION ABOVE THE XIPHOID. YES=1, NO=0. FIRE SAFETY: OPEN OXYGEN SOURCE. YES=1, NO=0. FIRE SAFETY: AVAILABLE IGNITION SOURCE. YES=1, NO=0. FIRE SAFETY: SCORE TOTAL = 1.      Viscous Lidocaine administered: Yes 12:19 PM     Cetacaine spray administered: Yes 12:19 PM     Procedural sedation administered per protocol via Dr. Terri Mane    Medications

## 2023-10-30 NOTE — DISCHARGE INSTRUCTIONS
CARDIOVERSION DISCHARGE INSTRUCTIONS    No driving for 24 hours. We strongly recommend that a responsible adult stay with you for the next 24 hours. Continue Eliquis    Hydrocortisone 1% cream to reddened areas as needed. JESUS MANUEL DISCHARGE INSTRUCTIONS      Continue Medications.     Advance diet as tolerated    Contact physician office  for following symptoms:  Fever  Difficulty swallowing  Chest pain  Difficulty breathing  Bleeding        Phone: [phone]

## 2023-10-31 ENCOUNTER — TELEPHONE (OUTPATIENT)
Dept: CARDIOLOGY CLINIC | Age: 73
End: 2023-10-31

## 2023-10-31 NOTE — TELEPHONE ENCOUNTER
I spoke with patient, he verbalized understanding. Pt is asking if he can take the cardizem as needed?

## 2023-10-31 NOTE — TELEPHONE ENCOUNTER
Pt states that around 8:00pm after echo JESUS MANUEL he started experiencing  an accelerated heart beat, and took heartbeat reducing medication around 9:00pm and again at 6:00am. Pt is asking what the next steps are. Pt also has a flight scheduled for 2:00pm tomorrow considering on cancelling it unless approved by [facility]    Please call to advise. Thank you .

## 2023-10-31 NOTE — TELEPHONE ENCOUNTER
If his HR is stable, he can continue the cardizem to help keep his heart in rhythm. We can switch him to extended release dosing if he wants.  Ok to go on flight    NANCIE Albert-CNP

## 2023-10-31 NOTE — TELEPHONE ENCOUNTER
Spoke with PT, Pt stated that before his echo JESUS MANUEL his HR was 120-130 but did not take his diltiazem before the procedure. His normal resting HR is in the 50s, His HR since the procedure has been between 70-85. He is wanting to know if he still should take his diltiazem to help his HR? He said he takes diltiazem 30mg 4 times a day.     Please Advise

## 2023-11-27 ENCOUNTER — TELEPHONE (OUTPATIENT)
Dept: CARDIOLOGY CLINIC | Age: 73
End: 2023-11-27

## 2023-11-27 NOTE — TELEPHONE ENCOUNTER
Recommend holter monitor, atleast 14 day.  He should continue the eliquis for now    Christian Kerr, NANCIE-CNP

## 2023-11-27 NOTE — TELEPHONE ENCOUNTER
Spoke with the patient, he states that in the past he was unable to wear the monitor d/t allergic reaction. Expressed to the patient that hypoallergenic options are available. Denies dizziness, fatigue, near syncope, or syncopal episodes. Patient agreeable to proceed with 14 day monitor.  Will contact the office with where he would like refill to be sent

## 2023-11-27 NOTE — TELEPHONE ENCOUNTER
Pt's heart rate has been low and is going down into the 40's. First question, is should he get a pace maker. Should he see [facility] before his 1/4 appointment to discuss? Second question is should he still be on Eliquis since he is not having episodes, if so, he will need a script and samples until he can get the script filled. Please call to discuss.

## 2023-11-28 ENCOUNTER — NURSE ONLY (OUTPATIENT)
Dept: CARDIOLOGY CLINIC | Age: 73
End: 2023-11-28

## 2023-11-28 DIAGNOSIS — R00.2 PALPITATIONS: Primary | ICD-10-CM

## 2023-11-28 NOTE — PROGRESS NOTES
14 Day E-Patch requested for pt. Device was registered and placed. Tutorial given, pt verbalized understanding.   Date-11/28/23  Time-0200pm  S/N  [id number]   niurka

## 2023-12-14 ENCOUNTER — TELEPHONE (OUTPATIENT)
Dept: CARDIOLOGY CLINIC | Age: 73
End: 2023-12-14

## 2023-12-14 NOTE — TELEPHONE ENCOUNTER
Medication Refill    Medication needing refilled:  apixaban (ELIQUIS)   Dosage of the medication:  5 MG TABS   How are you taking this medication (QD, BID, TID, QID, PRN):    30 or 90 day supply called in:  90  When will you run out of your medication:    Which Pharmacy are we sending the medication to?:  [place] [id number] ([facility]), [address] [place] [address] Matthew Ramos [address]  Phone: [phone]  Fax: [phone]

## 2023-12-14 NOTE — TELEPHONE ENCOUNTER
Received refill request for Eliquis 5mg from [facility] pharmacy.      Last OV: 10- NPSR    Next OV: 1-4-2024 [facility]    Last Labs: 10- CBC    Last Filled: 10- NPSR

## 2023-12-15 NOTE — TELEPHONE ENCOUNTER
Medication Refill    Medication needing refilled:  ELIQUIS     Dosage of the medication:  5mg    How are you taking this medication (QD, BID, TID, QID, PRN):  Take 1 tablet by mouth 2 times daily     30 or 90 day supply called in:  60    When will you run out of your medication:    Which Pharmacy are we sending the medication to?:    [place] [id number] [place] [address] ([facility]), [address] [facility] [address]  [address]  Phone: [phone]  Fax: [phone]       NOTE: [email or web address] he can purchase the med. Pt is wanting the office to send it in today.  Thank you

## 2024-01-03 NOTE — PROGRESS NOTES
8/8/2017:  - Left ventricle: The cavity size was mildly dilated. Wall thickness    was increased in a pattern of mild LVH. Systolic function was    normal. The estimated ejection fraction was in the range of 55% to    65%. Wall motion was normal; there were no regional wall motion    abnormalities.  - Right ventricle: Systolic function was normal. TAPSE: 2.9cm.  - Right atrium: The atrium was mildly dilated.      Echo 11/4/2010:  - Left ventricle: The cavity size was mildly to moderately dilated.     Wall thickness was normal. Systolic function was normal. The     estimated ejection fraction was in the range of 55% to 60%. Wall     motion was normal; there were no regional wall motion     abnormalities. Left ventricular diastolic function parameters     were otherwise normal.   - Mitral valve: Mild regurgitation.   - Left atrium: The atrium was mildly dilated.   - Right ventricle: The cavity size was mildly dilated.   - Pulmonic valve: Peak gradient: 4mm Hg (S).     I independently reviewed the cardiac diagnostic studies, ECG and relevant imaging studies.     Lab Results   Component Value Date    LVEF 55 02/06/2019     No results found for: \"TSHFT4\", \"TSH\"      Physical Examination:  Vitals:    01/04/24 1458   BP: (!) 142/82   Pulse: 59   SpO2: 97%          Wt Readings from Last 3 Encounters:   01/04/24 101.7 kg (224 lb 3.2 oz)   10/30/23 103 kg (227 lb)   10/24/23 103.1 kg (227 lb 3.2 oz)       Constitutional: Oriented. No distress.   Head: Normocephalic and atraumatic.   Mouth/Throat: Oropharynx is clear and moist.   Eyes: Conjunctivae normal. EOM are normal.   Neck: Neck supple. No rigidity. No JVD present.    Cardiovascular: Normal rate, regular rhythm, S1&S2.    Pulmonary/Chest: Bilateral respiratory sounds. No wheezes, No rhonchi.    Abdominal: Soft. Bowel sounds present. No distension, No tenderness.   Musculoskeletal: No tenderness. No edema    Lymphadenopathy: Has no cervical adenopathy.   Neurological:

## 2024-01-04 ENCOUNTER — OFFICE VISIT (OUTPATIENT)
Dept: CARDIOLOGY CLINIC | Age: 74
End: 2024-01-04

## 2024-01-04 VITALS
WEIGHT: 224.2 LBS | HEART RATE: 59 BPM | HEIGHT: 73 IN | DIASTOLIC BLOOD PRESSURE: 82 MMHG | BODY MASS INDEX: 29.72 KG/M2 | SYSTOLIC BLOOD PRESSURE: 142 MMHG | OXYGEN SATURATION: 97 %

## 2024-01-04 DIAGNOSIS — I47.10 SVT (SUPRAVENTRICULAR TACHYCARDIA): ICD-10-CM

## 2024-01-04 DIAGNOSIS — R00.1 BRADYCARDIA: ICD-10-CM

## 2024-01-04 DIAGNOSIS — I48.3 TYPICAL ATRIAL FLUTTER (HCC): Primary | ICD-10-CM

## 2024-03-22 ENCOUNTER — NURSE ONLY (OUTPATIENT)
Dept: CARDIOLOGY CLINIC | Age: 74
End: 2024-03-22
Payer: MEDICARE

## 2024-03-22 DIAGNOSIS — I48.3 TYPICAL ATRIAL FLUTTER (HCC): Primary | ICD-10-CM

## 2024-03-22 PROCEDURE — 93000 ELECTROCARDIOGRAM COMPLETE: CPT | Performed by: INTERNAL MEDICINE

## 2024-03-22 NOTE — TELEPHONE ENCOUNTER
Please see if he got the [employer] mobile device and if so can he send the tracings? If not can he come in for an ECG?

## 2024-03-22 NOTE — TELEPHONE ENCOUNTER
Pt called to inform [facility] that he is experiencing fast heart beat. The same thing that happen before when he had his procedure.  Pt wants to know if he should take diltiazem are is there something else that she can do for his issue.  Please advise.  Thank you

## 2024-03-25 ENCOUNTER — TELEPHONE (OUTPATIENT)
Dept: CARDIOLOGY CLINIC | Age: 74
End: 2024-03-25

## 2024-03-25 NOTE — TELEPHONE ENCOUNTER
Spoke to Pt, highest . He has been taking diltiazem as directed and Hr comes down for a short time then within 2 hrs back in the 100s. He would prefer ablation but ok with cardioversion. Please advise.

## 2024-03-25 NOTE — TELEPHONE ENCOUNTER
Pt states he has had SVT all weekend and asking if there is any chance ablation can be done today. Pt states he has not eaten anything and said [facility] was wanting to do this while he was having episodes. Please call pt to advise.

## 2024-03-26 ENCOUNTER — TELEPHONE (OUTPATIENT)
Dept: CARDIOLOGY CLINIC | Age: 74
End: 2024-03-26

## 2024-03-26 NOTE — TELEPHONE ENCOUNTER
Spoke with the pt and got him scheduled for procedure. I will reach out to schedule that once I have a date. We went over instructions below and he verbalized understanding.     Procedure -  JESUS MANUEL/CV   Date: 3/29/24  Arrival time: 1:00 pm  Procedure time:  2:00 pm       Patient Instructions  Dx:atrial flutter                         ICD-10 code: i48.92  Medication Instructions: Do not miss a dose of blood thinner   Do not eat or drink after midnight the night prior to procedure [x] Yes [] No   You will need a      Qgenda update / added in epic / emailed cath lab

## 2024-03-26 NOTE — TELEPHONE ENCOUNTER
LVM for pt to return call to schedule procedure and get information on ablation scheduling.     Procedure -  JESUS MANUEL/CV   Date:  Arrival time:  Procedure time:      Patient Instructions  Dx:atrial flutter                         ICD-10 code: i48.92  Medication Instructions: Do not miss a dose of blood thinner   Do not eat or drink after midnight the night prior to procedure [x] Yes [] No   You will need a

## 2024-03-26 NOTE — TELEPHONE ENCOUNTER
Per NPSR  - okay to plan JESUS MANUEL/DCCV and send scheduling letter for  atrial flutter ablation

## 2024-03-26 NOTE — TELEPHONE ENCOUNTER
Patient phoned asking for a call to discuss when cardioversion or ablation might be scheduled.  Please advise.  Thank you

## 2024-03-26 NOTE — TELEPHONE ENCOUNTER
Patient states current heart rate is 130s- 140s. He is hoping for cardioversion before this weekend and is agreeable to ablation.     He has only been on eliquis since Friday, Will need JESUS MANUEL .     Will discuss with provider

## 2024-03-29 ENCOUNTER — HOSPITAL ENCOUNTER (OUTPATIENT)
Dept: CARDIAC CATH/INVASIVE PROCEDURES | Age: 74
Discharge: HOME | End: 2024-03-29
Attending: INTERNAL MEDICINE | Admitting: INTERNAL MEDICINE
Payer: MEDICARE

## 2024-03-29 VITALS
HEIGHT: 73 IN | DIASTOLIC BLOOD PRESSURE: 73 MMHG | HEART RATE: 79 BPM | TEMPERATURE: 98 F | WEIGHT: 224 LBS | RESPIRATION RATE: 41 BRPM | SYSTOLIC BLOOD PRESSURE: 115 MMHG | BODY MASS INDEX: 29.69 KG/M2 | OXYGEN SATURATION: 97 %

## 2024-03-29 DIAGNOSIS — I47.10 SVT (SUPRAVENTRICULAR TACHYCARDIA) (HCC): Primary | ICD-10-CM

## 2024-03-29 DIAGNOSIS — I48.20 CHRONIC ATRIAL FIBRILLATION, UNSPECIFIED (HCC): ICD-10-CM

## 2024-03-29 LAB
EKG ATRIAL RATE: 249 BPM
EKG DIAGNOSIS: NORMAL
EKG Q-T INTERVAL: 378 MS
EKG QRS DURATION: 106 MS
EKG QTC CALCULATION (BAZETT): 480 MS
EKG R AXIS: 2 DEGREES
EKG T AXIS: 42 DEGREES
EKG VENTRICULAR RATE: 97 BPM

## 2024-03-29 PROCEDURE — 2500000003 HC RX 250 WO HCPCS

## 2024-03-29 PROCEDURE — 93010 ELECTROCARDIOGRAM REPORT: CPT | Performed by: INTERNAL MEDICINE

## 2024-03-29 PROCEDURE — 99152 MOD SED SAME PHYS/QHP 5/>YRS: CPT | Performed by: INTERNAL MEDICINE

## 2024-03-29 PROCEDURE — 93005 ELECTROCARDIOGRAM TRACING: CPT | Performed by: INTERNAL MEDICINE

## 2024-03-29 PROCEDURE — 93312 ECHO TRANSESOPHAGEAL: CPT

## 2024-03-29 PROCEDURE — 99152 MOD SED SAME PHYS/QHP 5/>YRS: CPT

## 2024-03-29 PROCEDURE — 92960 CARDIOVERSION ELECTRIC EXT: CPT | Performed by: INTERNAL MEDICINE

## 2024-03-29 PROCEDURE — 92960 CARDIOVERSION ELECTRIC EXT: CPT

## 2024-03-29 PROCEDURE — 93320 DOPPLER ECHO COMPLETE: CPT

## 2024-03-29 PROCEDURE — 99153 MOD SED SAME PHYS/QHP EA: CPT

## 2024-03-29 PROCEDURE — 7100000010 HC PHASE II RECOVERY - FIRST 15 MIN

## 2024-03-29 PROCEDURE — 93325 DOPPLER ECHO COLOR FLOW MAPG: CPT

## 2024-03-29 RX ORDER — SODIUM CHLORIDE 9 MG/ML
INJECTION, SOLUTION INTRAVENOUS PRN
Status: DISCONTINUED | OUTPATIENT
Start: 2024-03-29 | End: 2024-03-29 | Stop reason: HOSPADM

## 2024-03-29 RX ORDER — SODIUM CHLORIDE 0.9 % (FLUSH) 0.9 %
5-40 SYRINGE (ML) INJECTION PRN
Status: DISCONTINUED | OUTPATIENT
Start: 2024-03-29 | End: 2024-03-29 | Stop reason: HOSPADM

## 2024-03-29 RX ORDER — SODIUM CHLORIDE 0.9 % (FLUSH) 0.9 %
5-40 SYRINGE (ML) INJECTION EVERY 12 HOURS SCHEDULED
Status: DISCONTINUED | OUTPATIENT
Start: 2024-03-29 | End: 2024-03-29 | Stop reason: HOSPADM

## 2024-03-29 NOTE — DISCHARGE INSTRUCTIONS
CARDIOVERSION DISCHARGE INSTRUCTIONS    No driving for 24 hours. We strongly recommend that a responsible adult stay with you for the next 24 hours.    Continue Eliquis.    Hydrocortisone 1% cream to reddened areas as needed.  Phone: [phone]    Advance diet as tolerated    Contact physician office  for following symptoms:  Fever  Difficulty swallowing  Chest pain  Difficulty breathing  Bleeding

## 2024-03-29 NOTE — PROGRESS NOTES
I observe the patient for a few hours and his sinus node function recovered.    The findings today are consistent with sick sinus syndrome.  I will proceed with a Holter monitor but at the same time I think he has indication for pacemaker due to episodes of tachycardia that is hard to control and then episodes of significant bradycardia at baseline.

## 2024-03-29 NOTE — PROCEDURES
[facility]     Electrophysiology Procedure Note       Date of Procedure: 3/29/2024  Patient's Name: Irving Pascual  YOB: 1950   Medical Record Number: [id number]  Procedure Performed by: Eldon Goldstein MD    Procedures performed:  IV sedation.   Trans-esophageal echocardiography  External Electrical cardioversion   Mallampati3  ASA 3    Indication of the procedure:  atrial flutter      Details of procedure:   The patient was brought to the cath lab area in a fasting and non-sedated state. The risks, benefits and alternatives of the procedure were discussed with the patient. The patient opted to proceed with the procedure. Written informed consent was signed and placed in the chart.  A timeout protocol was completed to identify the patient and the procedure being performed.    IV sedation was provided with IV Versed, Fentanyl initially and JESUS MANUEL was performed which did not show any IDANIA/LA clot/thrombus. Full JESUS MANUEL reports will be dictated.     I pushed 50 mg Brevital.An independent trained observer pushed medications  at my direction.   We monitored the patient's level of consciousness and vital signs/physiologic status throughout the procedure duration (see start and stop times below).  Sedation:  2 mg Versed, 100 mcg Fentanyl   Sedation start: 1350  Sedation stop: 1420     Patient is on chronic anticoagulation therapy.   Then we used Brevital for sedation and electrical DC cardioversion was perfomred using 200J, synchronized shock. Patient was converted to junctional  rhythm at 52 bpm. The patient tolerated the procedure well and there were no complications.     Conclusion:   Successful external DC cardioversion of atrial  flutter     Plan:   The patient can be discharged if remains stable. Will continue with medical therapy.

## 2024-03-29 NOTE — PROGRESS NOTES
CATH LAB PROCEDURE LOG - TRANSESOPHAGEAL ECHOCARDIOGRAM, POSSIBLE CARDIOVERSION    PRE PROCEDURE    DATE: 3/29/2024 ARRIVAL TO CATH LAB: 1:21 PM    ID & ALLERGY BAND: On    CONSENT: Yes    NPO SINCE: Midnight    IV SITE : Started in right arm.  with fluids infusing at kvo 1:21 PM     Pt arrived to Cath Lab.   Plan of Care: Hemodynamics and cardiac rhythm will remain stable. Comfort level will be maintained. Respiratory function will remain adequate. Pt/family will verbalize understanding of the procedure. Procedure will be tolerated without complications. Patient will recover from procedure without complications.   ID armband on patient and identification verified.   Informed consent obtained.   Non invasive blood pressure cuff applied, monitoring initiated.   EKG pads and pulse oximeter applied, monitoring initiated.   Instructions given. Patient and / or family verbalize understanding.   H&P will be documented by physician in Epic.   Pt has been NPO since midnight.     Pre CV Rhythm: Atrial Flutter    Defibrillator pads applied to chest.       TRANSESOPHAGEAL ECHOCARDIOGRAM    Timeout and fire safety completed.     TIMEOUT TIME: 1:53 PM    CORRECT PATIENT VERIFIED. MEMBERS OF THE SURGICAL TEAM/VISITORS INTRODUCED. ALLERGIES ANNOUNCED. CORRECT PROCEDURE VERIFIED. CORRECT PROCEDURAL SITE VERIFIED. CONSENTS VERIFIED. IMPLANT EQUIPMENT, ADDITIONAL SERVICES, SPECIAL REQUIREMENTS AVAILABLE. MEDICATIONS LABELED AND AVAILABLE. APPROPRIATE PRE MEDS HAVE BEEN ADMINISTERED.    FIRE SAFETY: ALCOHOL PREP SOLUTION HAD SUFFICIENT TIME TO DISSIPATE IF USED. FIRE SAFETY: SURGICAL SITE OR INCISION ABOVE THE XIPHOID. YES=1, NO=0. FIRE SAFETY: OPEN OXYGEN SOURCE. YES=1, NO=0. FIRE SAFETY: AVAILABLE IGNITION SOURCE. YES=1, NO=0. FIRE SAFETY: SCORE TOTAL = 1.     Viscous Lidocaine administered: Yes 1:53 PM     Cetacaine spray administered: Yes 1:53 PM     Procedural sedation administered per protocol via Dr. SAIRA Matthews  verified by Shantelle Strickland RN    2:08 PM - Versed 1 mg IV  2:11 PM - Versed 0.5 mg IV  2:14 PM - Versed 0.5 mg IV    2:09 PM - Fentanyl 50 mcg IV  2:11 PM - Fentanyl 25 mcg IV  2:14 PM - Fentanyl 25 mcg IV    JESUS MANUEL probe passed and images obtained.  JESUS MANUEL probe removed without incident.    No thrombus noted. Per physician okay to proceed with cardioversion.    JESUS MANUEL Probe removed. Suction provided. No complications noted.      CARDIOVERSION    2:15 PM - Brevital 50 mg IV given by Dr CHÁVEZ    Synchronized Cardioversion performed at 200 joules  2:31 PM Rhythm was converted to Sinus Rhythm.       2:31 PM Pt waking up, respirations spontaneous, able to converse appropriately, follows commands, able to move extremities bilaterally, resting quietly.     Rhythm converted to Normal Sinus Rhythm

## 2024-03-29 NOTE — H&P
[facility]   Electrophysiology      CC: PAF  HPI: Irving Pascual is a 73 y.o. male with a past medical history of SVT, hypercholesterolemia, elevated HGB A1C .    Presented to ED 4/7/2018 for palpitations, CP, SOB, and feelings of dizziness after not sleeping well prior night. It is noted patient stated a history of -200 with SVT in past that he stated he thought was triggered by green tea and had no more episodes since. Denies pre-syncope or syncope episodes. He was drinking wine at the time the occurrence began. He did not follow up with cardiology for SVT in past.   He has recorded about 5 episodes since 4/2017 identified with Fitbit. He was able to use vagal maneuvers in past to cease or episodes did spontaneously convert. He has no past cardiac history other than bradycardia episodes in the 40-50's at rest. Not symptomatic with bradycardia.  Patient denies lightheadedness, dizziness, palpitations, orthopnea, edema, presyncope or syncope.     He is able to stop them with vagal maneuvers     He had an ablation 9/11/18 for AVNRt and atrial flutter  . He called office 10/16/2018 stating he had two \"episodes\" since his ablation, one on 9/28 and one on 10/2 after starting drinking coffee, both at 175 bpm and lasted 20 min.  . Began drinking tea and coffee as usual before ablation.       Patient called in 02/12/2022 with heart rate of 190.     S/p 11/30/2022: Ablation of AV demetra re-entrant tachycardia.Ablation of isthmus dependent typical atrial flutter    s/p JESUS MANUEL/DCCV 10/30/2023    MCOT 11/30/2023 (worn for 10 days)  Sx with SR and PVC (1% PVC/PAC burden)  Bradycardia between 11 pm and 7 am    Interval History:  Irving presents to the office in follow up. He is feeling well from a cardiac standpoint. He does occasionally feel skipped beats. Patient denies lightheadedness, dizziness, chest pain, orthopnea, edema, presyncope or syncope.     Assessment and plan:   Atrial Flutter   -ECG today shows SR   -s/p  fraction was in the range of 55% to    65%. Wall motion was normal; there were no regional wall motion    abnormalities.  - Right ventricle: Systolic function was normal. TAPSE: 2.9cm.  - Right atrium: The atrium was mildly dilated.      Echo 11/4/2010:  - Left ventricle: The cavity size was mildly to moderately dilated.     Wall thickness was normal. Systolic function was normal. The     estimated ejection fraction was in the range of 55% to 60%. Wall     motion was normal; there were no regional wall motion     abnormalities. Left ventricular diastolic function parameters     were otherwise normal.   - Mitral valve: Mild regurgitation.   - Left atrium: The atrium was mildly dilated.   - Right ventricle: The cavity size was mildly dilated.   - Pulmonic valve: Peak gradient: 4mm Hg (S).     I independently reviewed the cardiac diagnostic studies, ECG and relevant imaging studies.     Lab Results   Component Value Date    LVEF 55 02/06/2019     No results found for: \"TSHFT4\", \"TSH\"      Physical Examination:  Vitals:    01/04/24 1458   BP: (!) 142/82   Pulse: 59   SpO2: 97%          Wt Readings from Last 3 Encounters:   01/04/24 101.7 kg (224 lb 3.2 oz)   10/30/23 103 kg (227 lb)   10/24/23 103.1 kg (227 lb 3.2 oz)       Constitutional: Oriented. No distress.   Head: Normocephalic and atraumatic.   Mouth/Throat: Oropharynx is clear and moist.   Eyes: Conjunctivae normal. EOM are normal.   Neck: Neck supple. No rigidity. No JVD present.    Cardiovascular: Normal rate, regular rhythm, S1&S2.    Pulmonary/Chest: Bilateral respiratory sounds. No wheezes, No rhonchi.    Abdominal: Soft. Bowel sounds present. No distension, No tenderness.   Musculoskeletal: No tenderness. No edema    Lymphadenopathy: Has no cervical adenopathy.   Neurological: Alert and oriented. Cranial nerve appears intact, No Gross deficit   Skin: Skin is warm and dry. No rash noted.   Psychiatric: Has a normal behavior       Review of System:  [x] Full

## 2024-03-30 LAB
EKG ATRIAL RATE: 29 BPM
EKG DIAGNOSIS: NORMAL
EKG Q-T INTERVAL: 428 MS
EKG QRS DURATION: 102 MS
EKG QTC CALCULATION (BAZETT): 390 MS
EKG R AXIS: 1 DEGREES
EKG T AXIS: 26 DEGREES
EKG VENTRICULAR RATE: 50 BPM

## 2024-03-30 PROCEDURE — 93010 ELECTROCARDIOGRAM REPORT: CPT | Performed by: INTERNAL MEDICINE

## 2024-04-01 ENCOUNTER — TELEPHONE (OUTPATIENT)
Dept: CARDIOLOGY CLINIC | Age: 74
End: 2024-04-01

## 2024-04-01 NOTE — TELEPHONE ENCOUNTER
----- Message from Eldon Goldstein MD sent at 3/29/2024  5:51 PM EDT -----  Hi    Needs pacemaker for sss in next 2-4 weeks

## 2024-04-01 NOTE — TELEPHONE ENCOUNTER
Spoke with the pt and got him scheduled. However, he wants to get monitor results back and speak with the dr before doing procedure. We went over    Procedure -Dual PPM   Date:5/3/24  Arrival time: 12:30 pm    Procedure time: 1:30 pm      Patient Instructions  Dx:atrial flutter                         ICD-10 code: i48.92  Medication Instructions: Do not miss a dose of blood thinner   Do not eat or drink after midnight the night prior to procedure [x] Yes [] No   You will need a      Qgenda updated / added in epic / emailed cath lab

## 2024-04-02 NOTE — TELEPHONE ENCOUNTER
Pt called back stating they would like to schedule appt with [facility] before they proceed with the pacemaker, pt would like to see if they can get appt end of April once the monitor results are back?    Pt will be sending monitor back 4/4.    Pls advise thank you

## 2024-04-05 ENCOUNTER — TELEPHONE (OUTPATIENT)
Dept: CARDIOLOGY CLINIC | Age: 74
End: 2024-04-05

## 2024-04-05 NOTE — TELEPHONE ENCOUNTER
Sample requested:  Eliquis    Strength: 5mg    Dosage:  1 tablet 2 times daily    Patient's call back number:  [phone]    Pt is asking for samples for 1 week.  Please call to advise.  Thank you.

## 2024-04-24 ENCOUNTER — TELEPHONE (OUTPATIENT)
Dept: CARDIOLOGY CLINIC | Age: 74
End: 2024-04-24

## 2024-04-24 NOTE — TELEPHONE ENCOUNTER
Pt would like a phone call back if he needs to be taking his elequis before having pacemaker put in   Please call and advise   Thank you

## 2024-04-26 ENCOUNTER — TELEPHONE (OUTPATIENT)
Dept: CARDIOLOGY CLINIC | Age: 74
End: 2024-04-26

## 2024-04-26 NOTE — TELEPHONE ENCOUNTER
Cancelled patients 05/02 OV with [facility].     Patient has procedure 05/03 and follow up 05/09.     Appointment unnecessary. Left patient VM advising of cancellation.    Balbuena catheter problem, subsequent encounter

## 2024-05-03 ENCOUNTER — HOSPITAL ENCOUNTER (OUTPATIENT)
Dept: CARDIAC CATH/INVASIVE PROCEDURES | Age: 74
Discharge: HOME | End: 2024-05-03
Attending: INTERNAL MEDICINE | Admitting: INTERNAL MEDICINE
Payer: MEDICARE

## 2024-05-03 ENCOUNTER — APPOINTMENT (OUTPATIENT)
Dept: GENERAL RADIOLOGY | Age: 74
End: 2024-05-03
Attending: INTERNAL MEDICINE
Payer: MEDICARE

## 2024-05-03 VITALS
HEIGHT: 73 IN | SYSTOLIC BLOOD PRESSURE: 142 MMHG | WEIGHT: 224 LBS | TEMPERATURE: 98.1 F | HEART RATE: 63 BPM | BODY MASS INDEX: 29.69 KG/M2 | RESPIRATION RATE: 19 BRPM | OXYGEN SATURATION: 95 % | DIASTOLIC BLOOD PRESSURE: 81 MMHG

## 2024-05-03 LAB
ANION GAP SERPL CALCULATED.3IONS-SCNC: 11 MMOL/L (ref 3–16)
BUN SERPL-MCNC: 16 MG/DL (ref 7–20)
CALCIUM SERPL-MCNC: 9.4 MG/DL (ref 8.3–10.6)
CHLORIDE SERPL-SCNC: 104 MMOL/L (ref 99–110)
CO2 SERPL-SCNC: 25 MMOL/L (ref 21–32)
CREAT SERPL-MCNC: 1 MG/DL (ref 0.8–1.3)
DEPRECATED RDW RBC AUTO: 14.5 % (ref 12.4–15.4)
EKG ATRIAL RATE: 52 BPM
EKG DIAGNOSIS: NORMAL
EKG P AXIS: 21 DEGREES
EKG P-R INTERVAL: 156 MS
EKG Q-T INTERVAL: 430 MS
EKG QRS DURATION: 94 MS
EKG QTC CALCULATION (BAZETT): 396 MS
EKG R AXIS: -8 DEGREES
EKG T AXIS: 49 DEGREES
EKG VENTRICULAR RATE: 51 BPM
GFR SERPLBLD CREATININE-BSD FMLA CKD-EPI: 79 ML/MIN/{1.73_M2}
GLUCOSE SERPL-MCNC: 121 MG/DL (ref 70–99)
HCT VFR BLD AUTO: 48.6 % (ref 40.5–52.5)
HGB BLD-MCNC: 16.2 G/DL (ref 13.5–17.5)
MCH RBC QN AUTO: 29.7 PG (ref 26–34)
MCHC RBC AUTO-ENTMCNC: 33.4 G/DL (ref 31–36)
MCV RBC AUTO: 89.1 FL (ref 80–100)
PLATELET # BLD AUTO: 128 K/UL (ref 135–450)
PMV BLD AUTO: 9.6 FL (ref 5–10.5)
POTASSIUM SERPL-SCNC: 4.2 MMOL/L (ref 3.5–5.1)
RBC # BLD AUTO: 5.45 M/UL (ref 4.2–5.9)
SODIUM SERPL-SCNC: 140 MMOL/L (ref 136–145)
WBC # BLD AUTO: 5.7 K/UL (ref 4–11)

## 2024-05-03 PROCEDURE — 2500000003 HC RX 250 WO HCPCS

## 2024-05-03 PROCEDURE — 99152 MOD SED SAME PHYS/QHP 5/>YRS: CPT

## 2024-05-03 PROCEDURE — 99153 MOD SED SAME PHYS/QHP EA: CPT

## 2024-05-03 PROCEDURE — 99152 MOD SED SAME PHYS/QHP 5/>YRS: CPT | Performed by: INTERNAL MEDICINE

## 2024-05-03 PROCEDURE — 93005 ELECTROCARDIOGRAM TRACING: CPT

## 2024-05-03 PROCEDURE — 2580000003 HC RX 258

## 2024-05-03 PROCEDURE — C1894 INTRO/SHEATH, NON-LASER: HCPCS

## 2024-05-03 PROCEDURE — 85027 COMPLETE CBC AUTOMATED: CPT

## 2024-05-03 PROCEDURE — 33208 INSRT HEART PM ATRIAL & VENT: CPT | Performed by: INTERNAL MEDICINE

## 2024-05-03 PROCEDURE — C1892 INTRO/SHEATH,FIXED,PEEL-AWAY: HCPCS

## 2024-05-03 PROCEDURE — C1769 GUIDE WIRE: HCPCS

## 2024-05-03 PROCEDURE — 33999 UNLISTED PX CARDIAC SURGERY: CPT

## 2024-05-03 PROCEDURE — 80048 BASIC METABOLIC PNL TOTAL CA: CPT

## 2024-05-03 PROCEDURE — 71045 X-RAY EXAM CHEST 1 VIEW: CPT

## 2024-05-03 PROCEDURE — 33208 INSRT HEART PM ATRIAL & VENT: CPT

## 2024-05-03 PROCEDURE — C1889 IMPLANT/INSERT DEVICE, NOC: HCPCS

## 2024-05-03 PROCEDURE — C1898 LEAD, PMKR, OTHER THAN TRANS: HCPCS

## 2024-05-03 PROCEDURE — C1785 PMKR, DUAL, RATE-RESP: HCPCS

## 2024-05-03 PROCEDURE — 6360000002 HC RX W HCPCS

## 2024-05-03 PROCEDURE — 36415 COLL VENOUS BLD VENIPUNCTURE: CPT

## 2024-05-03 RX ORDER — SODIUM CHLORIDE 0.9 % (FLUSH) 0.9 %
5-40 SYRINGE (ML) INJECTION EVERY 12 HOURS SCHEDULED
Status: DISCONTINUED | OUTPATIENT
Start: 2024-05-03 | End: 2024-05-03 | Stop reason: HOSPADM

## 2024-05-03 RX ORDER — SODIUM CHLORIDE 0.9 % (FLUSH) 0.9 %
5-40 SYRINGE (ML) INJECTION PRN
Status: DISCONTINUED | OUTPATIENT
Start: 2024-05-03 | End: 2024-05-03 | Stop reason: HOSPADM

## 2024-05-03 RX ORDER — SODIUM CHLORIDE 9 MG/ML
INJECTION, SOLUTION INTRAVENOUS PRN
Status: DISCONTINUED | OUTPATIENT
Start: 2024-05-03 | End: 2024-05-03 | Stop reason: HOSPADM

## 2024-05-03 NOTE — PROGRESS NOTES
PRE-PROCEDURE    DATE: 5/3/2024 ARRIVAL TO CATH LAB: 11:13 AM    ADMIT SOURCE: Outpatient    ID & ALLERGY BAND: On    CONSENT: Yes    NPO SINCE: Midnight    LABS/PREGNANCY TEST: N/A      IV SITE : Started in L arm.  with fluids infusing at kvo 11:13 AM     EKG RHYTHM: Sinus Bradycardia

## 2024-05-03 NOTE — H&P
[facility]   Electrophysiology      CC: PAF  HPI: Irving Pascual is a 73 y.o. male with a past medical history of SVT, hypercholesterolemia, elevated HGB A1C .    Presented to ED 4/7/2018 for palpitations, CP, SOB, and feelings of dizziness after not sleeping well prior night. It is noted patient stated a history of -200 with SVT in past that he stated he thought was triggered by green tea and had no more episodes since. Denies pre-syncope or syncope episodes. He was drinking wine at the time the occurrence began. He did not follow up with cardiology for SVT in past.   He has recorded about 5 episodes since 4/2017 identified with Fitbit. He was able to use vagal maneuvers in past to cease or episodes did spontaneously convert. He has no past cardiac history other than bradycardia episodes in the 40-50's at rest. Not symptomatic with bradycardia.  Patient denies lightheadedness, dizziness, palpitations, orthopnea, edema, presyncope or syncope.     He is able to stop them with vagal maneuvers     He had an ablation 9/11/18 for AVNRt and atrial flutter  . He called office 10/16/2018 stating he had two \"episodes\" since his ablation, one on 9/28 and one on 10/2 after starting drinking coffee, both at 175 bpm and lasted 20 min.  . Began drinking tea and coffee as usual before ablation.       Patient called in 02/12/2022 with heart rate of 190.     S/p 11/30/2022: Ablation of AV demetra re-entrant tachycardia.Ablation of isthmus dependent typical atrial flutter    s/p JESUS MANUEL/DCCV 10/30/2023    MCOT 11/30/2023 (worn for 10 days)  Sx with SR and PVC (1% PVC/PAC burden)  Bradycardia between 11 pm and 7 am    Interval History:  Irving presents to the office in follow up. He is feeling well from a cardiac standpoint. He does occasionally feel skipped beats. Patient denies lightheadedness, dizziness, chest pain, orthopnea, edema, presyncope or syncope.     Assessment and plan:   Atrial Flutter   -ECG today shows SR   -s/p  7/5/2018:  CONCLUSION:  SINUS RHYTHM  SINUS BRADYCARDIA TO RATES AS LOW AS 38 BPM  TWO RUNS OF SVT TO RATES BETWEEN 180-200 BPM  SYMPTOMS WERE NOTED DURING ONE OF THE EPISODES THAT LASTED AROUND   1 MINUTE   THERE WERE SHORT RUNS OF SVT WITHOUT SYMPTOMS  PAC'S WERE NOTED AND SYMPTOMS WERE NOTED WITH THE PAC'S      Echo 8/8/2017:  - Left ventricle: The cavity size was mildly dilated. Wall thickness    was increased in a pattern of mild LVH. Systolic function was    normal. The estimated ejection fraction was in the range of 55% to    65%. Wall motion was normal; there were no regional wall motion    abnormalities.  - Right ventricle: Systolic function was normal. TAPSE: 2.9cm.  - Right atrium: The atrium was mildly dilated.      Echo 11/4/2010:  - Left ventricle: The cavity size was mildly to moderately dilated.     Wall thickness was normal. Systolic function was normal. The     estimated ejection fraction was in the range of 55% to 60%. Wall     motion was normal; there were no regional wall motion     abnormalities. Left ventricular diastolic function parameters     were otherwise normal.   - Mitral valve: Mild regurgitation.   - Left atrium: The atrium was mildly dilated.   - Right ventricle: The cavity size was mildly dilated.   - Pulmonic valve: Peak gradient: 4mm Hg (S).     I independently reviewed the cardiac diagnostic studies, ECG and relevant imaging studies.     Lab Results   Component Value Date    LVEF 55 02/06/2019     No results found for: \"TSHFT4\", \"TSH\"      Physical Examination:  Vitals:    01/04/24 1458   BP: (!) 142/82   Pulse: 59   SpO2: 97%          Wt Readings from Last 3 Encounters:   01/04/24 101.7 kg (224 lb 3.2 oz)   10/30/23 103 kg (227 lb)   10/24/23 103.1 kg (227 lb 3.2 oz)       Constitutional: Oriented. No distress.   Head: Normocephalic and atraumatic.   Mouth/Throat: Oropharynx is clear and moist.   Eyes: Conjunctivae normal. EOM are normal.   Neck: Neck supple. No rigidity. No JVD  drugs.     Family History:  Reviewed. Reviewed. No family history of SCD.      Relevant and available labs, and cardiovascular diagnostics reviewed.   Reviewed.

## 2024-05-03 NOTE — DISCHARGE INSTRUCTIONS
Post-Device Implant     1. Wound Care  -Bathe daily but keep incision dry and clean until your 7-10 day follow up  -Do not shower until you are told to do so by your physician.  -Do not remove the outer dressing unless it is soiled  -Allow the thin pieces of tape (Steri-Strips) to fall off naturally. Do not pick or pull at these unless instructed to do so by your physician.    2. Contact the cardiologists office for these concerns:   -Increased swelling and/or tenderness in incision and/or extending down the arm on the same side as implant site   -Feeling increased palpitations or irregular heart beat   -Redness of incision or drainage from incision.   -Bleeding that does not stop or increases.  -Skin pimples along incision.  -If a suture works its way through the incision.  -Fever greater than 100.5    3. Do not rub or twist the device site    4. Avoid lifting objects heavier than 10 pounds for one month. Do not raise the arm on the side where the device was implanted over your head for one month. These rules help to heal the implant site and stabilize the heart lead wires.    5. Avoid tight clothing over the incision.    6. No driving for one week or until initial visit after your procedure.    7. Carry your temporary Device  I.D. Card with you until your permanent card arrives in four to six weeks. An I.D.Card should be with you always.    8. If you have a defibrillator (AICD) and receive a shock or hear a tone from the device call the doctor's office    9. An implanted device does not replace any medications, diet or activity restrictions that you had before the implant. Check with your cardiologist regarding questions    10.Reasons to seek medical attention immediately: Call 911   -Sudden onset of chest pain, shortness of breath, dizziness, or loss of balance or coordination   -Sudden Change in vision   -Sudden confusion or trouble speaking and/or understanding    -Sudden onset of numbness or weakness of

## 2024-05-06 PROBLEM — Z95.0 PACEMAKER: Status: ACTIVE | Noted: 2024-05-06

## 2024-05-10 ENCOUNTER — NURSE ONLY (OUTPATIENT)
Dept: CARDIOLOGY CLINIC | Age: 74
End: 2024-05-10

## 2024-05-10 DIAGNOSIS — R00.1 BRADYCARDIA: ICD-10-CM

## 2024-05-10 DIAGNOSIS — I48.3 TYPICAL ATRIAL FLUTTER (HCC): ICD-10-CM

## 2024-05-10 DIAGNOSIS — Z95.0 PACEMAKER: Primary | ICD-10-CM

## 2024-05-10 DIAGNOSIS — R00.2 PALPITATIONS: ICD-10-CM

## 2024-05-10 DIAGNOSIS — I47.10 SVT (SUPRAVENTRICULAR TACHYCARDIA) (HCC): ICD-10-CM

## 2024-05-10 NOTE — PROGRESS NOTES
See cardiology tab    Dressing removed from left chest device site. SS CDI. Red bumps noted around incision site going down left arm suggesting allergic reaction. RMM suggested possible reaction from dressing and recommended to cleanse around the area with antibacterial soap in the shower and should go away in a few days. Pt informed to call office if any redness, swelling, fever, chills, or drainage from site. Pt voiced an understanding.

## 2024-05-14 ENCOUNTER — TELEPHONE (OUTPATIENT)
Dept: CARDIOLOGY CLINIC | Age: 74
End: 2024-05-14

## 2024-05-14 ENCOUNTER — NURSE ONLY (OUTPATIENT)
Dept: CARDIOLOGY CLINIC | Age: 74
End: 2024-05-14
Payer: MEDICARE

## 2024-05-14 DIAGNOSIS — R00.1 BRADYCARDIA: ICD-10-CM

## 2024-05-14 DIAGNOSIS — Z95.0 PACEMAKER: Primary | ICD-10-CM

## 2024-05-14 NOTE — PROGRESS NOTES
Patient came into due to having an electrical/engery like sensation over his chest area with increased HR at 96 bpm. Device shows no episodes. Device functioning as programmed. Did not see Rash on Friday but does not look bad at all today. Barely vision dots on his arm. Incision is healing nicely. Little irritation from outside bandage noted. I did lowered detection zones today for both AT/AF and VT zones. Patient does have phone Isela for HM. Showed him how to send remote transmission today.

## 2024-05-14 NOTE — TELEPHONE ENCOUNTER
EP Triage Questionnaire  Does the patient have a device? [x]Y  [] N, If yes ask them to please send an interrogation.     What is their HR and BP?  HR 96    Have they had a recent procedure? Yes Pacemaker placed on 5/3/24    What symptoms are they having? Feels like energy in his chest    How long have they had symptoms? Started today 5/14/24    Have they taken or missed medications recently and if so which ones?

## 2024-05-14 NOTE — TELEPHONE ENCOUNTER
I spoke with pt at length. He said that he does not have a a device reader. I explained possible reasons for an elevated HR. He stated that he feels that his symptoms have settled down and just wants to wait and  see what happens.He stated that he has a [employer] . I told him that he can send us an EKG via My Chart when he has an episode or we can bring him in for a device check.  He was advised to go to the ER with worsening symptoms.

## 2024-05-16 PROCEDURE — 93280 PM DEVICE PROGR EVAL DUAL: CPT | Performed by: INTERNAL MEDICINE

## 2024-05-17 ENCOUNTER — TELEPHONE (OUTPATIENT)
Dept: CARDIOLOGY CLINIC | Age: 74
End: 2024-05-17

## 2024-05-17 NOTE — TELEPHONE ENCOUNTER
Spoke with the pt and he doesn't want ablation currently. Just got his pacemaker and going to see how that goes. He will call me if and when ready to schedule.

## 2024-06-26 ENCOUNTER — NURSE ONLY (OUTPATIENT)
Dept: CARDIOLOGY CLINIC | Age: 74
End: 2024-06-26

## 2024-06-26 DIAGNOSIS — I48.3 TYPICAL ATRIAL FLUTTER (HCC): ICD-10-CM

## 2024-06-26 DIAGNOSIS — R00.1 BRADYCARDIA: ICD-10-CM

## 2024-06-26 DIAGNOSIS — Z95.0 PACEMAKER: Primary | ICD-10-CM

## 2024-08-28 PROCEDURE — 93294 REM INTERROG EVL PM/LDLS PM: CPT | Performed by: INTERNAL MEDICINE

## 2024-08-28 PROCEDURE — 93296 REM INTERROG EVL PM/IDS: CPT | Performed by: INTERNAL MEDICINE

## 2024-09-12 ENCOUNTER — NURSE ONLY (OUTPATIENT)
Dept: CARDIOLOGY CLINIC | Age: 74
End: 2024-09-12

## 2024-09-12 ENCOUNTER — OFFICE VISIT (OUTPATIENT)
Dept: CARDIOLOGY CLINIC | Age: 74
End: 2024-09-12

## 2024-09-12 VITALS
HEART RATE: 66 BPM | DIASTOLIC BLOOD PRESSURE: 74 MMHG | HEIGHT: 73 IN | OXYGEN SATURATION: 98 % | BODY MASS INDEX: 29.99 KG/M2 | WEIGHT: 226.3 LBS | SYSTOLIC BLOOD PRESSURE: 122 MMHG

## 2024-09-12 DIAGNOSIS — R00.1 BRADYCARDIA: ICD-10-CM

## 2024-09-12 DIAGNOSIS — Z95.0 PACEMAKER: Primary | ICD-10-CM

## 2024-09-12 DIAGNOSIS — I47.10 SVT (SUPRAVENTRICULAR TACHYCARDIA) (HCC): ICD-10-CM

## 2024-09-12 DIAGNOSIS — I48.3 TYPICAL ATRIAL FLUTTER (HCC): ICD-10-CM

## 2024-09-12 DIAGNOSIS — H93.13 SUBJECTIVE TINNITUS OF BOTH EARS: ICD-10-CM

## 2025-01-16 ENCOUNTER — HOSPITAL ENCOUNTER (OUTPATIENT)
Dept: GENERAL RADIOLOGY | Age: 75
Discharge: HOME | End: 2025-01-16
Payer: MEDICARE

## 2025-01-16 ENCOUNTER — TELEPHONE (OUTPATIENT)
Dept: CARDIOLOGY CLINIC | Age: 75
End: 2025-01-16

## 2025-01-16 ENCOUNTER — NURSE ONLY (OUTPATIENT)
Dept: CARDIOLOGY CLINIC | Age: 75
End: 2025-01-16

## 2025-01-16 ENCOUNTER — HOSPITAL ENCOUNTER (OUTPATIENT)
Age: 75
Discharge: HOME | End: 2025-01-16
Payer: MEDICARE

## 2025-01-16 DIAGNOSIS — I48.3 TYPICAL ATRIAL FLUTTER (HCC): ICD-10-CM

## 2025-01-16 DIAGNOSIS — I47.10 SVT (SUPRAVENTRICULAR TACHYCARDIA) (HCC): ICD-10-CM

## 2025-01-16 DIAGNOSIS — R00.1 BRADYCARDIA: ICD-10-CM

## 2025-01-16 DIAGNOSIS — Z95.0 PACEMAKER: ICD-10-CM

## 2025-01-16 DIAGNOSIS — Z95.0 PACEMAKER: Primary | ICD-10-CM

## 2025-01-16 PROCEDURE — 71046 X-RAY EXAM CHEST 2 VIEWS: CPT

## 2025-01-16 NOTE — TELEPHONE ENCOUNTER
FYI. Patients remote today shows increase in RV threshold and decrease in R wave. This is a change since last check. I sent report through [facility] for review. Thanks.

## 2025-01-16 NOTE — PROGRESS NOTES
Brought Patient into the device clinic today for a device check due to High RV Thresholds noted on Remote Transmission.     Testing done in office today.     RV Threshold testing  2.50V @ 0.40ms Bipolar  1.50V @ 1.00ms Bipolar   2.00V @ 0.40ms Unipolar  1.25V @ 1.00ms Unipolar    Sensing testing  R wave 3.9V-4.5V Bipolar  6.0V-6.4V Unipolar      Patient had recent fall that correlates with increase in Threshold and decrease in Sensing    Will get Chest X-ray today to make sure there has been no lead movement.    Patient VU.

## 2025-02-09 NOTE — PROGRESS NOTES
reviewed as a part of this visit    LABS    CBC:   Lab Results   Component Value Date    WBC 5.7 05/03/2024    HGB 16.2 05/03/2024    HCT 48.6 05/03/2024    MCV 89.1 05/03/2024     (L) 05/03/2024     BMP:   Lab Results   Component Value Date    CREATININE 1.0 05/03/2024    BUN 16 05/03/2024     05/03/2024    K 4.2 05/03/2024     05/03/2024    CO2 25 05/03/2024     CrCl cannot be calculated (Patient's most recent lab result is older than the maximum 180 days allowed.).     Thyroid:   Lab Results   Component Value Date    TSH 2.27 10/02/2019        Lipid Panel: No results found for: \"CHOL\", \"HDL\", \"TRIG\"     LFTs:  Lab Results   Component Value Date    ALT 22 04/07/2018    AST 24 04/07/2018    ALKPHOS 57 04/07/2018    BILITOT 0.5 04/07/2018     Coags:   Lab Results   Component Value Date    PROTIME 12.2 04/07/2018    INR 1.08 04/07/2018       ECG: 3/11/2025  - Atrial paced with PVCs. Rate 73, QRS 90, QTc 374    JESUS MANUEL: 3/24  Overall left ventricular function is normal. Trivial to mild mitral regurgitation. There is no evidence of mass or thrombus in the left atrium or appendage. Plaque is noted in the thoracic aorta.    Echo: 8/2017 ()  - Left ventricle: The cavity size was mildly dilated. Wall thickness was increased in a pattern of mild LVH. Systolic function was normal. The estimated ejection fraction was in the range of 55% to  65%. Wall motion was normal; there were no regional wall motion abnormalities.  - Right ventricle: Systolic function was normal. TAPSE: 2.9cm.  - Right atrium: The atrium was mildly dilated.    GXT: 1/25 (Rom)  The result of this study is abnormal                   The risk of the study is moderate by [facility] treadmill score   Normal myocardial perfusion. Normal LV systolic function   Abnormal electrocardiographic response to exercise without chest pain     Cath: None    CT Cardiac Calcium Score: 5/22  LEFT MAIN: 292  RIGHT CORONARY ARTERY: 381  LEFT ANTERIOR DESCENDING:

## 2025-03-11 ENCOUNTER — NURSE ONLY (OUTPATIENT)
Dept: CARDIOLOGY CLINIC | Age: 75
End: 2025-03-11

## 2025-03-11 ENCOUNTER — OFFICE VISIT (OUTPATIENT)
Dept: CARDIOLOGY CLINIC | Age: 75
End: 2025-03-11
Payer: MEDICARE

## 2025-03-11 VITALS
SYSTOLIC BLOOD PRESSURE: 126 MMHG | OXYGEN SATURATION: 98 % | WEIGHT: 225.4 LBS | DIASTOLIC BLOOD PRESSURE: 74 MMHG | BODY MASS INDEX: 29.87 KG/M2 | HEART RATE: 70 BPM | HEIGHT: 73 IN

## 2025-03-11 DIAGNOSIS — I47.10 SVT (SUPRAVENTRICULAR TACHYCARDIA): ICD-10-CM

## 2025-03-11 DIAGNOSIS — I47.10 SVT (SUPRAVENTRICULAR TACHYCARDIA): Primary | ICD-10-CM

## 2025-03-11 DIAGNOSIS — Z95.0 PACEMAKER: Primary | ICD-10-CM

## 2025-03-11 DIAGNOSIS — I48.3 TYPICAL ATRIAL FLUTTER (HCC): ICD-10-CM

## 2025-03-11 DIAGNOSIS — R00.1 BRADYCARDIA: ICD-10-CM

## 2025-03-11 DIAGNOSIS — Z95.0 PACEMAKER: ICD-10-CM

## 2025-03-11 PROCEDURE — G8417 CALC BMI ABV UP PARAM F/U: HCPCS | Performed by: NURSE PRACTITIONER

## 2025-03-11 PROCEDURE — G8427 DOCREV CUR MEDS BY ELIG CLIN: HCPCS | Performed by: NURSE PRACTITIONER

## 2025-03-11 PROCEDURE — G2211 COMPLEX E/M VISIT ADD ON: HCPCS | Performed by: NURSE PRACTITIONER

## 2025-03-11 PROCEDURE — 1036F TOBACCO NON-USER: CPT | Performed by: NURSE PRACTITIONER

## 2025-03-11 PROCEDURE — 1123F ACP DISCUSS/DSCN MKR DOCD: CPT | Performed by: NURSE PRACTITIONER

## 2025-03-11 PROCEDURE — 1159F MED LIST DOCD IN RCRD: CPT | Performed by: NURSE PRACTITIONER

## 2025-03-11 PROCEDURE — 1160F RVW MEDS BY RX/DR IN RCRD: CPT | Performed by: NURSE PRACTITIONER

## 2025-03-11 PROCEDURE — 1126F AMNT PAIN NOTED NONE PRSNT: CPT | Performed by: NURSE PRACTITIONER

## 2025-03-11 PROCEDURE — 93000 ELECTROCARDIOGRAM COMPLETE: CPT | Performed by: NURSE PRACTITIONER

## 2025-03-11 PROCEDURE — 3017F COLORECTAL CA SCREEN DOC REV: CPT | Performed by: NURSE PRACTITIONER

## 2025-03-11 RX ORDER — ASPIRIN 81 MG/1
81 TABLET ORAL DAILY
Qty: 90 TABLET | Refills: 0
Start: 2025-03-11

## 2025-06-11 ENCOUNTER — TELEPHONE (OUTPATIENT)
Dept: CARDIAC REHAB | Age: 75
End: 2025-06-11

## 2025-06-12 ENCOUNTER — HOSPITAL ENCOUNTER (OUTPATIENT)
Dept: CARDIAC REHAB | Age: 75
Setting detail: THERAPIES SERIES
Discharge: HOME | End: 2025-06-12
Payer: MEDICARE

## 2025-06-12 VITALS
HEART RATE: 72 BPM | DIASTOLIC BLOOD PRESSURE: 76 MMHG | BODY MASS INDEX: 29.16 KG/M2 | SYSTOLIC BLOOD PRESSURE: 140 MMHG | RESPIRATION RATE: 16 BRPM | HEIGHT: 74 IN | WEIGHT: 227.2 LBS

## 2025-06-12 PROCEDURE — 93798 PHYS/QHP OP CAR RHAB W/ECG: CPT

## 2025-06-12 ASSESSMENT — PATIENT HEALTH QUESTIONNAIRE - PHQ9
SUM OF ALL RESPONSES TO PHQ QUESTIONS 1-9: 3
7. TROUBLE CONCENTRATING ON THINGS, SUCH AS READING THE NEWSPAPER OR WATCHING TELEVISION: SEVERAL DAYS
SUM OF ALL RESPONSES TO PHQ QUESTIONS 1-9: 3
4. FEELING TIRED OR HAVING LITTLE ENERGY: SEVERAL DAYS
SUM OF ALL RESPONSES TO PHQ QUESTIONS 1-9: 3
1. LITTLE INTEREST OR PLEASURE IN DOING THINGS: NOT AT ALL
8. MOVING OR SPEAKING SO SLOWLY THAT OTHER PEOPLE COULD HAVE NOTICED. OR THE OPPOSITE, BEING SO FIGETY OR RESTLESS THAT YOU HAVE BEEN MOVING AROUND A LOT MORE THAN USUAL: NOT AT ALL
6. FEELING BAD ABOUT YOURSELF - OR THAT YOU ARE A FAILURE OR HAVE LET YOURSELF OR YOUR FAMILY DOWN: NOT AT ALL
3. TROUBLE FALLING OR STAYING ASLEEP: SEVERAL DAYS
SUM OF ALL RESPONSES TO PHQ QUESTIONS 1-9: 3
2. FEELING DOWN, DEPRESSED OR HOPELESS: NOT AT ALL
5. POOR APPETITE OR OVEREATING: NOT AT ALL
9. THOUGHTS THAT YOU WOULD BE BETTER OFF DEAD, OR OF HURTING YOURSELF: NOT AT ALL

## 2025-06-12 NOTE — CARDIO/PULMONARY
Cardiac Rehab Routine and RPE scale, pt v/u.  Developed individual treatment plan and goals set with patient; pt in agreement with plan and no further questions at this time.  Performed six minute walk test.  Next visit scheduled for  Thursday June 19, 2025 at the 11:00 AM class time.    TINO ABRAMS RN  6/12/2025

## 2025-06-13 RX ORDER — CYCLOBENZAPRINE HCL 10 MG
10 TABLET ORAL 2 TIMES DAILY PRN
COMMUNITY

## 2025-06-13 RX ORDER — ROSUVASTATIN CALCIUM 20 MG/1
10 TABLET, COATED ORAL
COMMUNITY
Start: 2025-06-03

## 2025-06-13 RX ORDER — IVERMECTIN 3 MG/1
18 TABLET ORAL PRN
COMMUNITY

## 2025-06-13 RX ORDER — CLOPIDOGREL BISULFATE 75 MG/1
75 TABLET ORAL DAILY
COMMUNITY
Start: 2025-05-21

## 2025-06-19 ENCOUNTER — APPOINTMENT (OUTPATIENT)
Dept: CARDIAC REHAB | Age: 75
End: 2025-06-19
Payer: MEDICARE

## 2025-06-24 ENCOUNTER — HOSPITAL ENCOUNTER (OUTPATIENT)
Dept: CARDIAC REHAB | Age: 75
Setting detail: THERAPIES SERIES
Discharge: HOME | End: 2025-06-24
Payer: MEDICARE

## 2025-06-24 PROCEDURE — 93798 PHYS/QHP OP CAR RHAB W/ECG: CPT

## 2025-06-26 ENCOUNTER — HOSPITAL ENCOUNTER (OUTPATIENT)
Dept: CARDIAC REHAB | Age: 75
Setting detail: THERAPIES SERIES
Discharge: HOME | End: 2025-06-26
Payer: MEDICARE

## 2025-06-26 PROCEDURE — 93798 PHYS/QHP OP CAR RHAB W/ECG: CPT

## 2025-06-28 ENCOUNTER — PATIENT MESSAGE (OUTPATIENT)
Dept: CARDIOLOGY CLINIC | Age: 75
End: 2025-06-28

## 2025-06-30 ENCOUNTER — TELEPHONE (OUTPATIENT)
Dept: CARDIAC REHAB | Age: 75
End: 2025-06-30

## 2025-06-30 NOTE — TELEPHONE ENCOUNTER
Pt. Called and will not be able to attend Cardiac rehab this week.  Will call to determine if he is going to continue or exercise on own.

## 2025-07-08 ENCOUNTER — HOSPITAL ENCOUNTER (OUTPATIENT)
Dept: CARDIAC REHAB | Age: 75
Setting detail: THERAPIES SERIES
Discharge: HOME | End: 2025-07-08

## 2025-07-17 ENCOUNTER — TELEPHONE (OUTPATIENT)
Dept: CARDIAC REHAB | Age: 75
End: 2025-07-17

## 2025-07-17 NOTE — TELEPHONE ENCOUNTER
Pt. Called and advised that he is going to discontinue Cardiac rehab at this time and exercise at home.